# Patient Record
Sex: FEMALE | Race: WHITE
[De-identification: names, ages, dates, MRNs, and addresses within clinical notes are randomized per-mention and may not be internally consistent; named-entity substitution may affect disease eponyms.]

---

## 2017-09-22 ENCOUNTER — HOSPITAL ENCOUNTER (OUTPATIENT)
Dept: HOSPITAL 76 - DI | Age: 42
Discharge: HOME | End: 2017-09-22
Attending: FAMILY MEDICINE
Payer: COMMERCIAL

## 2017-09-22 DIAGNOSIS — M67.952: Primary | ICD-10-CM

## 2017-09-22 NOTE — MRI REPORT
EXAM:

LEFT HIP MRI WITHOUT CONTRAST

 

EXAM DATE: 9/22/2017 10:32 AM.

 

CLINICAL HISTORY:  Left hip pain for 10 years.

 

COMPARISON: None.

 

TECHNIQUE: Multiplanar, multisequence T1-weighted and fluid-sensitive, small field-of-view sequences 
of the hip and large field-of-view sequences of the pelvis without contrast. Other: None.

 

FINDINGS: 

Bones: No fractures or subluxations. No marrow edema or bone lesions. No avascular necrosis.

 

Left Hip: No acetabular retroversion. Femoral head/neck offset is within normal limits. No effusion o
r loose bodies. The articular cartilage is intact. No labral tear is demonstrated on this nonarthrogr
aphic study. The ligamentum teres is intact.

 

Other Joints: The visualized lumbar spine, sacroiliac joints, symphysis pubis, and contralateral hip 
are unremarkable.

 

Musculature: No edema or fatty atrophy.  Mild insertional tendinopathy of bilateral gluteus medius an
d minimus tendons with mild edema. The visualized hamstring tendons are normal. The left ischiofemora
l space is narrowed with mild edema in the quadratus femoris suggestive of ischiofemoral impingement.
 No sports hernia.

 

Pelvic Cavity: The visualized viscera are unremarkable. No lymphadenopathy. No free fluid in the pelv
is.

 

Other: The visualized sciatic nerves are unremarkable. No bursitis. The subcutaneous tissues are unre
markable. 

 

IMPRESSION: 

1. The left ischiofemoral space is narrowed with mild edema in the quadratus femoris suggestive of is
chiofemoral impingement.

2. No labral tear is demonstrated on this nonarthrographic study.

3. No fractures or acute bony abnormality.

4. Mild insertional tendinopathy of bilateral gluteus medius and minimus tendons with mild edema.

 

[facility] MUSCULOSKELET[facility]IOLOGY SECTION

Referring Provider Line: [phone]

 

SITE ID: [id number]

## 2019-02-25 ENCOUNTER — HOSPITAL ENCOUNTER (OUTPATIENT)
Dept: HOSPITAL 76 - DI.WCP | Age: 44
Discharge: HOME | End: 2019-02-25
Attending: INTERNAL MEDICINE
Payer: COMMERCIAL

## 2019-02-25 DIAGNOSIS — M19.042: ICD-10-CM

## 2019-02-25 DIAGNOSIS — M54.5: Primary | ICD-10-CM

## 2019-02-25 PROCEDURE — 72170 X-RAY EXAM OF PELVIS: CPT

## 2019-02-26 NOTE — XRAY REPORT
Reason:  PAIN UNSPECIFIED JOINT, LOW BACK PAIN

Procedure Date:  02/25/2019   

Accession Number:  [id number] / [id number]                    

Procedure:  WCP - Foot 2 View BILAT CPT Code:  

 

FULL RESULT:

 

 

EXAMS:

1. Right Foot Radiography

2. Left Foot Radiography

 

EXAM DATE: 2/25/2019 03:00 PM.

 

CLINICAL HISTORY: PAIN UNSPECIFIED JOINT, LOW BACK PAIN.

 

COMPARISON: None.

 

TECHNIQUE: 2 views each foot.

 

FINDINGS:

Right:

Bones: Postop changes first metatarsus with screws No fractures or bone 

lesions.

 

Joints: Normal. No subluxations.

 

Soft Tissues: Normal. No soft tissue swelling.

 

Left:

Bones: Postop changes first metatarsus with screws No fractures or bone 

lesions.

 

Joints: Normal. No subluxations.

 

Soft Tissues: Normal. No soft tissue swelling.

IMPRESSION: Bilateral post bunion surgery.

No acute findings

 

[facility]

## 2019-02-26 NOTE — XRAY REPORT
Reason:  PAIN UNSPECIFIED JOINT, LOW BACK PAIN

Procedure Date:  02/25/2019   

Accession Number:  [id number] / [id number]                    

Procedure:  WCP - Hand 2 View BILAT CPT Code:  

 

FULL RESULT:

 

 

EXAMS:

1. Right Hand Radiography

2. Left Hand Radiography

 

EXAM DATE: 2/25/2019 03:10 PM.

 

CLINICAL HISTORY: PAIN UNSPECIFIED JOINT, LOW BACK PAIN.

 

COMPARISON: None.

 

TECHNIQUE: 3 views each hand.

 

FINDINGS:

Right:

Bones: Normal. No fractures or bone lesions.

 

Joints: Normal. No subluxations.

 

Soft Tissues: Normal. No soft tissue swelling.

 

Left:

Bones: Normal. No fractures or bone lesions.

 

Joints: Fifth DIP mild osteophyte

 

Soft Tissues: Normal. No soft tissue swelling.

IMPRESSION:

1. Negative right hand.

2. Mild degenerative changes fifth DIP joint

 

[facility]

## 2021-03-03 ENCOUNTER — HOSPITAL ENCOUNTER (OUTPATIENT)
Dept: HOSPITAL 76 - DI | Age: 46
Discharge: HOME | End: 2021-03-03
Attending: STUDENT IN AN ORGANIZED HEALTH CARE EDUCATION/TRAINING PROGRAM
Payer: COMMERCIAL

## 2021-03-03 DIAGNOSIS — R74.8: Primary | ICD-10-CM

## 2021-03-03 NOTE — MRI REPORT
PROCEDURE:  MRCP W/O

 

INDICATIONS:  ELEVATED DIRECT BILIRUBIN

 

CONTRAST: None

 

TECHNIQUE:  

Coronal ultra fast SE through the abdomen, axial 2-D spoiled GE in- and out-of-phase, and breath-hold
 T2 FSE with fat saturation through the biliary system and pancreas.  Oblique coronal and axial thin-
slice ultra fast SE, radial thick-slab ultra fast SE centered on the extrahepatic bile ducts.  

 

COMPARISON:     

 

FINDINGS:  

Image quality:  Excellent.  

 

Pancreas and biliary system:  Intra- and extra-hepatic biliary ducts are non dilated.  Pancreas is no
rmal in morphology, without adjacent soft tissue edema.  Pancreatic duct is normal in caliber, withou
t developmental anomalies.  Gallbladder demonstrates an anterior septum, no stones, and no polyps. No
 gallbladder wall thickening is present. The cystic duct is normal in caliber. The common bile duct a
lso is normal. No intrahepatic biliary distention is seen. There is no common duct stone.

 

Other solid organs:  Liver and spleen are normal in size.  No adrenal nodules.  Both kidneys are norm
al in size, without hydronephrosis.  

 

Nodes and vessels:  No retroperitoneal or mesenteric adenopathy by size criteria.  Aorta and inferior
 vena cava are normal in size.  

 

Bowel and peritoneum:  Unenhanced bowel loops are normal in caliber.  No free fluid.  

 

Lung bases:  No basal pleural effusions.  Heart size is normal.  Bilateral breast implants show no ev
idence of implant rupture.

 

Bones and soft tissues:  No ventral hernias.  Bone marrow is of normal overall signal.  

 

IMPRESSION:  

No sign of biliary distention or ductal calculus. There is no inflammation involving the gallbladder.
 There is an anterior septum within the gallbladder, a normal anatomic variant not related  to inflam
matory symptomatology. Overall, the source of elevated bilirubin is not identified. Intrinsic hepatoc
ellular disease may be present. A hepatic mass lesion is not seen.

 

Reviewed by: Merrick Kapoor MD on 3/3/2021 5:28 PM PST

Approved by: Merrick Kapoor MD on 3/3/2021 5:28 PM PST

 

 

Station ID:  [id number]

## 2022-03-08 ENCOUNTER — HOSPITAL ENCOUNTER (OUTPATIENT)
Dept: HOSPITAL 76 - ED | Age: 47
Setting detail: OBSERVATION
LOS: 1 days | Discharge: HOME | End: 2022-03-09
Attending: NURSE PRACTITIONER | Admitting: INTERNAL MEDICINE
Payer: COMMERCIAL

## 2022-03-08 DIAGNOSIS — D64.9: ICD-10-CM

## 2022-03-08 DIAGNOSIS — E87.1: ICD-10-CM

## 2022-03-08 DIAGNOSIS — F41.9: ICD-10-CM

## 2022-03-08 DIAGNOSIS — I10: ICD-10-CM

## 2022-03-08 DIAGNOSIS — G40.909: ICD-10-CM

## 2022-03-08 DIAGNOSIS — E86.0: ICD-10-CM

## 2022-03-08 DIAGNOSIS — Z20.822: ICD-10-CM

## 2022-03-08 DIAGNOSIS — R00.2: ICD-10-CM

## 2022-03-08 DIAGNOSIS — M79.7: ICD-10-CM

## 2022-03-08 DIAGNOSIS — F32.A: ICD-10-CM

## 2022-03-08 DIAGNOSIS — E87.6: Primary | ICD-10-CM

## 2022-03-08 DIAGNOSIS — N28.9: ICD-10-CM

## 2022-03-08 LAB
ANION GAP SERPL CALCULATED.4IONS-SCNC: 14 MMOL/L (ref 6–13)
B PARAPERT DNA SPEC QL NAA+PROBE: NOT DETECTED
B PERT DNA SPEC QL NAA+PROBE: NOT DETECTED
BASOPHILS NFR BLD AUTO: 0.1 10^3/UL (ref 0–0.1)
BASOPHILS NFR BLD AUTO: 1.3 %
BUN SERPL-MCNC: 23 MG/DL (ref 6–20)
C PNEUM DNA NPH QL NAA+NON-PROBE: NOT DETECTED
CALCIUM UR-MCNC: 9.7 MG/DL (ref 8.5–10.3)
CHLORIDE SERPL-SCNC: 88 MMOL/L (ref 101–111)
CO2 SERPL-SCNC: 29 MMOL/L (ref 21–32)
CREAT SERPLBLD-SCNC: 1.4 MG/DL (ref 0.4–1)
EOSINOPHIL # BLD AUTO: 0.2 10^3/UL (ref 0–0.7)
EOSINOPHIL NFR BLD AUTO: 3.3 %
ERYTHROCYTE [DISTWIDTH] IN BLOOD BY AUTOMATED COUNT: 17.2 % (ref 12–15)
FLUAV RNA RESP QL NAA+PROBE: NOT DETECTED
GFRSERPLBLD MDRD-ARVRAT: 40 ML/MIN/{1.73_M2} (ref 89–?)
GLUCOSE SERPL-MCNC: 138 MG/DL (ref 70–100)
HAEM INFLU B DNA SPEC QL NAA+PROBE: NOT DETECTED
HCOV 229E RNA SPEC QL NAA+PROBE: NOT DETECTED
HCOV HKU1 RNA UPPER RESP QL NAA+PROBE: NOT DETECTED
HCOV NL63 RNA ASPIRATE QL NAA+PROBE: NOT DETECTED
HCOV OC43 RNA SPEC QL NAA+PROBE: NOT DETECTED
HCT VFR BLD AUTO: 30.4 % (ref 37–47)
HGB UR QL STRIP: 8.8 G/DL (ref 12–16)
HMPV AG SPEC QL: NOT DETECTED
HPIV1 RNA NPH QL NAA+PROBE: NOT DETECTED
HPIV2 SPEC QL CULT: NOT DETECTED
HPIV3 AB TITR SER CF: NOT DETECTED {TITER}
HPIV4 RNA SPEC QL NAA+PROBE: NOT DETECTED
LYMPHOCYTES # SPEC AUTO: 1.4 10^3/UL (ref 1.5–3.5)
LYMPHOCYTES NFR BLD AUTO: 26.8 %
M PNEUMO DNA SPEC QL NAA+PROBE: NOT DETECTED
MAGNESIUM SERPL-MCNC: 2.3 MG/DL (ref 1.7–2.8)
MCH RBC QN AUTO: 23.6 PG (ref 27–31)
MCHC RBC AUTO-ENTMCNC: 28.9 G/DL (ref 32–36)
MCV RBC AUTO: 81.5 FL (ref 81–99)
MONOCYTES # BLD AUTO: 0.6 10^3/UL (ref 0–1)
MONOCYTES NFR BLD AUTO: 10.5 %
NEUTROPHILS # BLD AUTO: 3 10^3/UL (ref 1.5–6.6)
NEUTROPHILS # SNV AUTO: 5.2 X10^3/UL (ref 4.8–10.8)
NEUTROPHILS NFR BLD AUTO: 57.7 %
NRBC # BLD AUTO: 0 /100WBC
NRBC # BLD AUTO: 0 X10^3/UL
PDW BLD AUTO: 9.2 FL (ref 7.9–10.8)
PHOSPHATE BLD-MCNC: 2.9 MG/DL (ref 2.5–4.6)
PLATELET # BLD: 191 10^3/UL (ref 130–450)
POTASSIUM SERPL-SCNC: 2.2 MMOL/L (ref 3.5–5)
RBC MAR: 3.73 10^6/UL (ref 4.2–5.4)
RSV RNA RESP QL NAA+PROBE: NOT DETECTED
RV+EV RNA SPEC QL NAA+PROBE: NOT DETECTED
SARS-COV-2 RNA PNL SPEC NAA+PROBE: NOT DETECTED
SODIUM SERPLBLD-SCNC: 131 MMOL/L (ref 135–145)

## 2022-03-08 PROCEDURE — 99283 EMERGENCY DEPT VISIT LOW MDM: CPT

## 2022-03-08 PROCEDURE — 82746 ASSAY OF FOLIC ACID SERUM: CPT

## 2022-03-08 PROCEDURE — 84132 ASSAY OF SERUM POTASSIUM: CPT

## 2022-03-08 PROCEDURE — 0202U NFCT DS 22 TRGT SARS-COV-2: CPT

## 2022-03-08 PROCEDURE — 80048 BASIC METABOLIC PNL TOTAL CA: CPT

## 2022-03-08 PROCEDURE — 71045 X-RAY EXAM CHEST 1 VIEW: CPT

## 2022-03-08 PROCEDURE — 99285 EMERGENCY DEPT VISIT HI MDM: CPT

## 2022-03-08 PROCEDURE — 85025 COMPLETE CBC W/AUTO DIFF WBC: CPT

## 2022-03-08 PROCEDURE — 82607 VITAMIN B-12: CPT

## 2022-03-08 PROCEDURE — 84466 ASSAY OF TRANSFERRIN: CPT

## 2022-03-08 PROCEDURE — 83735 ASSAY OF MAGNESIUM: CPT

## 2022-03-08 PROCEDURE — 96360 HYDRATION IV INFUSION INIT: CPT

## 2022-03-08 PROCEDURE — 96361 HYDRATE IV INFUSION ADD-ON: CPT

## 2022-03-08 PROCEDURE — 36415 COLL VENOUS BLD VENIPUNCTURE: CPT

## 2022-03-08 PROCEDURE — 80175 DRUG SCREEN QUAN LAMOTRIGINE: CPT

## 2022-03-08 PROCEDURE — 93005 ELECTROCARDIOGRAM TRACING: CPT

## 2022-03-08 PROCEDURE — 84484 ASSAY OF TROPONIN QUANT: CPT

## 2022-03-08 PROCEDURE — 83540 ASSAY OF IRON: CPT

## 2022-03-08 PROCEDURE — 83880 ASSAY OF NATRIURETIC PEPTIDE: CPT

## 2022-03-08 PROCEDURE — 84100 ASSAY OF PHOSPHORUS: CPT

## 2022-03-08 NOTE — HISTORY & PHYSICAL EXAMINATION
Chief Complaint





- Chief Complaint


Chief Complaint: My doctor sent me into ER





History of Present Illness





- Admitted From


Admitted From:: ED





- History Obtained From


History obtained from: ED provider and the patient





- History of Present Illness


HPI Comment/Other: 





This is a 46-year-old white female with a history of seizure disorder, 

fibromyalgia, anxiety and hypertension. She was sent into the ED by her doctor's

office after being called and told her potassium was very low.  She had had 

blood tests done earlier to check her seizure medication blood levels and 

electrolytes.  The patient denied any significant symptoms in the ED such as 

chest pain, shortness of breath, syncope or palpitations. Her labs were redone 

in the ED and showed a potassium of 2.2, sodium 131, hemoglobin 8.8 (the last 

hemoglobin was 14, from labs in 2014), and creatinine 1.4. She started to get iv

fluids and potassium replacement in the ED and is being placed in Observation 

status to continue potassium replacement while on telemetry. The patient denies 

any hematemesis or melena.  She takes Chlorthalidone for blood pressure control,

as one of her medications. She is also on anti-seizure medications.  These blood

levels were not rechecked today in the ED.








History





- Past Medical History


Cardiovascular: reports: Hypertension


Respiratory: reports: None


Neuro: reports: Seizure disorder


Endocrine/Autoimmune: reports: None


GI: reports: None


GYN: reports: None


: reports: None


HEENT: reports: None


Psych: reports: Depression, Anxiety, Other (Remote Hx of alcohol abuse from age 

16 to 2013.)


Musculoskeletal: reports: Fibromyalgia


Derm: reports: None





- Past Surgical History


Cardiovascular: reports: Other





- Family & Social History


Family History Comment/Other: She has 3 children who are adults and have moved 

out of the house. 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


Living arrangement: At home


Living Situation: With spouse/s.o.


Social History Notes: She admits to alcohol abuse from age 16 until 2013, when 

her seizure disorder was discovered.  She was sober through all her pregnancies 

she reported and currently has a rare beer or cocktail. She is a non-smoker and 

never smoked.





- Substance History


Use: Uses substance without health or social issues: NONE





Meds/Allgy





- Home Medications


Home Medications: 


                                Ambulatory Orders











 Medication  Instructions  Recorded  Confirmed


 


Buspirone HCl 10 mg PO DAILY 03/08/22 03/08/22


 


Chlorthalidone 25 mg PO DAILY 03/08/22 03/08/22


 


Duloxetine HCl [Cymbalta] 60 mg PO DAILY 03/08/22 03/08/22


 


Topiramate 50 mg PO DAILY 03/08/22 03/08/22


 


lamoTRIgine [LaMICtal] 100 mg PO DAILY 03/08/22 03/08/22














- Allergies


Allergies/Adverse Reactions: 


                                    Allergies











Allergy/AdvReac Type Severity Reaction Status Date / Time


 


Sulfa (Sulfonamide Allergy  Anaphylaxis Verified 03/08/22 19:42





Antibiotics)     














Review of Systems





- Cardiovascular


Cariovascular: reports: Palpitations (Joann skipped beats), Other (She has had 2-3

episodes of "body shaking" and near syncope lately when she stands.)





- Musculoskeletal


Musculoskeletal: reports: Back pain, Joint pain (from Fibromyalgia)





- Neurological


Neurological: reports: Seizures (Last grand mal seizure was remote, but she gets

"auras" for which she takes Lorazepam prn.)





- All Other Systems


All Other Systems: reports: Reviewed and negative





Exam





- Vital Signs


Reviewed Vital Signs: Yes


Vital Signs: 





                                Vital Signs x48h











  Temp Pulse Resp BP Pulse Ox


 


 03/08/22 19:42  36.5 C  87  16  148/80 H  100














- Physical Exam


General Appearance: positive: No acute distress, Alert, Other (Hair is shaven 

off, she is wearing a baseball cap.)


Eyes Bilateral: positive: Normal inspection, EOMI


ENT: positive: ENT inspection nml, No signs of dehydration


Neck: positive: Nml inspection, No JVD


Respiratory: positive: No respiratory distress, Breath sounds nml


Cardiovascular: positive: Regular rate & rhythm, No murmur


Abdomen: positive: Non-tender, Nml bowel sounds, No distention


Skin: positive: Warm, Dry


Extremities: positive: Non-tender, No pedal edema


Neurologic/Psychiatric: positive: Oriented x3, Other (Non-focal)





Conclusion/Plan





- Problem List


(1) Hypokalemia


Conclusion/Plan: 


This is likely due to Chlorthalidone daily and long-term use, without a 

Potassium supplement.


We will continue to replace with IV maintenance fluids containing potassium and 

will give potassium riders IV and oral potassium p.o.


Follow her potassium level every 6-12 hours.


Continue on telemetry while she has such severely low K


We will also check a serum magnesium level with her next potassium blood draw 

since this could also be lost when using Chlorthalidone.








(2) Hyponatremia


Conclusion/Plan: 


This is likely hypovolemic hyponatremia, given her use of thiazide diuretic and 

the new DELORIS.


Will continue replacement with IV normal saline


Follow BMP daily








(3) Acute renal insufficiency


Conclusion/Plan: 


This is likely from her diuretic use.


In review of systems she told me she has had "2-3 episodes of "body shaking" and

near syncope lately when she stands". This could be from orthostasis. She and 

her  think it could be seizures trying to break through, and for these 

symptoms her serum seizure drug levels were drawn, ordered by her doctor.


Continue with IV fluids that were started in the ED. Stop and remain off the 

thiazide diuretic will be advised.


Avoid nephrotoxins.


Follow BUN/creatinine daily.


Will check orthostatic vital signs in the morning.


I advised she not use the chlorthalidone any longer as a blood pressure 

medicine. A different one can be started if needed.








(4) Anemia


Conclusion/Plan: 


Will check her serum iron stores and B12 and folate levels and guaiac of her 

stool to initiate the work-up of cause of anemia.


Qualifiers: 


   Anemia type: unspecified type   Qualified Code(s): D64.9 - Anemia, 

unspecified   





(5) HTN (hypertension)


Conclusion/Plan: 


Stop and remain off the thiazide diuretic will be advised. I discussed this in 

detail with the patient and explained why. Her "body shaking when she stands" 

symptom, could be near syncope from orthostasis.


Will check orthostatic vital signs in the morning


Her supine vital signs will be monitored and she may need to be started on a 

different agent for blood pressure control, if needed, potentially using 

Amlodipine 2.5 mg daily.








(6) Seizure disorder


Conclusion/Plan: 


Her usual anti-seizure medications will be continued.








(7) Anxiety


Conclusion/Plan: 


Her usual medications will be continued.


She requests a one-time oral medication for anxiety currently. Lorazepam 1 mg 

p.o. at bedtime now will be ordered








(8) Fibromyalgia


Conclusion/Plan: 


Her usual medications will be continued








- Lab Results


Fish Bones: 


                                 03/08/22 19:56





                                 03/08/22 19:56

## 2022-03-08 NOTE — XRAY REPORT
PROCEDURE:  Chest 1 View X-Ray

 

INDICATIONS:  dyspnea

 

TECHNIQUE:  One view of the chest was acquired.  

 

COMPARISON:  None

 

FINDINGS:  

 

Surgical changes and devices:  None.  

 

Lungs and pleura:  No pleural effusions or pneumothorax.  Lungs are clear.  

 

Mediastinum:  Mediastinal contours appear normal.  Heart size is normal.  

 

Bones and chest wall:  No suspicious bony lesions.  Overlying soft tissues appear unremarkable.  

 

IMPRESSION:  

Chest without acute cardiopulmonary abnormalities. No focal airspace disease.

 

Reviewed by: Kaleb Sun MD on 3/8/2022 8:28 PM PST

Approved by: Kaleb Sun MD on 3/8/2022 8:28 PM [facility]

 

 

Station ID:  [id number]

## 2022-03-08 NOTE — ED PHYSICIAN DOCUMENTATION
History of Present Illness





- Stated complaint


Stated Complaint: LOW POTASSIUM





- Chief complaint


Chief Complaint: General





- History obtained from


History obtained from: Patient





- History of Present Illness


Timing: Today


Pain level max: 0


Pain level now: 0





- Additonal information


Additional information: 





Patient is a 38-year-old female who presents to the emergency department stating

that she was called by her doctor to come to the emergency department for a low 

potassium level today.  She states that she does not know how low the potassium 

was that the doctor from the [facility] did not tell her.  She states that she 

has not had issues with potassium in the past.  She states that she has felt 

mildly short of breath for several weeks.  Has not noticed any blood in the 

stool.  Nothing makes it better or worse.  Occasionally has palpitations.





Review of Systems


Ten Systems: 10 systems reviewed and negative


Constitutional: denies: Fever, Chills


Throat: denies: Sore throat


Cardiac: denies: Chest pain / pressure, Calf pain


GI: denies: Nausea, Vomiting, Diarrhea, Hematemesis


Skin: denies: Rash


Musculoskeletal: denies: Neck pain, Back pain


Neurologic: denies: Headache





PD PAST MEDICAL HISTORY





- Past Medical History


Past Medical History: Yes


Cardiovascular: Hypertension


Neuro: Seizure disorder





- Present Medications


Home Medications: 


                                Ambulatory Orders











 Medication  Instructions  Recorded  Confirmed


 


Buspirone HCl 10 mg PO DAILY 03/08/22 03/08/22


 


Chlorthalidone 25 mg PO DAILY 03/08/22 03/08/22


 


Duloxetine HCl [Cymbalta] 60 mg PO DAILY 03/08/22 03/08/22


 


Topiramate 50 mg PO DAILY 03/08/22 03/08/22


 


lamoTRIgine [LaMICtal] 100 mg PO DAILY 03/08/22 03/08/22














- Allergies


Allergies/Adverse Reactions: 


                                    Allergies











Allergy/AdvReac Type Severity Reaction Status Date / Time


 


Sulfa (Sulfonamide Allergy  Anaphylaxis Verified 03/08/22 19:42





Antibiotics)     














- Living Situation


Living Arrangement: reports: At home





PD ED PE NORMAL





- Vitals


Vital signs reviewed: Yes





- General


General: Alert and oriented X 3, No acute distress





- HEENT


HEENT: PERRL, Moist mucous membranes





- Neck


Neck: Supple, no meningeal sign





- Cardiac


Cardiac: RRR, Strong equal pulses





- Respiratory


Respiratory: No respiratory distress, Clear bilaterally





- Abdomen


Abdomen: Soft, Non tender, Non distended





- Rectal


Rectal: Pt declined





- Derm


Derm: Warm and dry





- Extremities


Extremities: No edema, No calf tenderness / cord





- Neuro


Neuro: Alert and oriented X 3





- Psych


Psych: Normal mood, Normal affect





Results





- Vitals


Vitals: 


                               Vital Signs - 24 hr











  03/08/22





  19:42


 


Temperature 36.5 C


 


Heart Rate 87


 


Respiratory 16





Rate 


 


Blood Pressure 148/80 H


 


O2 Saturation 100








                                     Oxygen











O2 Source                      Room air

















- EKG (time done)


  ** 2007


Rate: Rate (enter#) (67)


Rhythm: NSR


Axis: Normal


Intervals: Normal AK


QRS: Normal


Ischemia: Normal ST segments





- Labs


Labs: 


                                Laboratory Tests











  03/08/22 03/08/22 03/08/22





  19:56 19:56 19:56


 


WBC  5.2  


 


RBC  3.73 L  


 


Hgb  8.8 L  


 


Hct  30.4 L  


 


MCV  81.5  


 


MCH  23.6 L  


 


MCHC  28.9 L  


 


RDW  17.2 H  


 


Plt Count  191  


 


MPV  9.2  


 


Neut # (Auto)  3.0  


 


Lymph # (Auto)  1.4 L  


 


Mono # (Auto)  0.6  


 


Eos # (Auto)  0.2  


 


Baso # (Auto)  0.1  


 


Absolute Nucleated RBC  0.00  


 


Nucleated RBC %  0.0  


 


Sodium   131 L 


 


Potassium   2.2 L* 


 


Chloride   88 L 


 


Carbon Dioxide   29 


 


Anion Gap   14.0 H 


 


BUN   23 H 


 


Creatinine   1.4 H 


 


Estimated GFR (MDRD)   40 L 


 


Glucose   138 H 


 


Calcium   9.7 


 


Phosphorus   2.9 


 


Magnesium   2.3 


 


Troponin I High Sens    7.4


 


B-Natriuretic Peptide   














  03/08/22





  19:56


 


WBC 


 


RBC 


 


Hgb 


 


Hct 


 


MCV 


 


MCH 


 


MCHC 


 


RDW 


 


Plt Count 


 


MPV 


 


Neut # (Auto) 


 


Lymph # (Auto) 


 


Mono # (Auto) 


 


Eos # (Auto) 


 


Baso # (Auto) 


 


Absolute Nucleated RBC 


 


Nucleated RBC % 


 


Sodium 


 


Potassium 


 


Chloride 


 


Carbon Dioxide 


 


Anion Gap 


 


BUN 


 


Creatinine 


 


Estimated GFR (MDRD) 


 


Glucose 


 


Calcium 


 


Phosphorus 


 


Magnesium 


 


Troponin I High Sens 


 


B-Natriuretic Peptide  30














- Rads (name of study)


  ** cxr


Radiology: Final report received, EMP read contemporaneously, See rad report (no

 acute disease)





PD MEDICAL DECISION MAKING





- ED course


Complexity details: reviewed results, re-evaluated patient, considered 

differential, d/w patient, d/w consultant


ED course: 





46-year-old female with what appears to be dehydration, acute renal 

insufficiency and hypokalemia, likely all secondary to the chlorthalidone.  

Patient is well-appearing, nontoxic.  Given IV fluids and IV potassium.  We will

 place the patient in observation for continued care.  She also has anemia, 

unclear etiology.  She declined a rectal exam here.  She has not had a 

colonoscopy before, but could consider this as an outpatient.  Discussed the 

case with Dr. Ramirez, hospitalist who accepts for observation





This document was made in part using voice recognition software. While efforts 

are made to proofread this document, sound alike and grammatical errors may 

occur.





The only old labs that are available currently are from 2014.





Departure





- Departure


Disposition: ED Place in Observation


Clinical Impression: 


 Hypokalemia, Acute renal insufficiency, Hyponatremia, Hypochloremia





Anemia


Qualifiers:


 Anemia type: unspecified type Qualified Code(s): D64.9 - Anemia, unspecified





Condition: Stable

## 2022-03-09 VITALS — SYSTOLIC BLOOD PRESSURE: 118 MMHG | DIASTOLIC BLOOD PRESSURE: 69 MMHG

## 2022-03-09 LAB
ANION GAP SERPL CALCULATED.4IONS-SCNC: 13 MMOL/L (ref 6–13)
BASOPHILS NFR BLD AUTO: 0 10^3/UL (ref 0–0.1)
BASOPHILS NFR BLD AUTO: 0.9 %
BUN SERPL-MCNC: 19 MG/DL (ref 6–20)
CALCIUM UR-MCNC: 9.6 MG/DL (ref 8.5–10.3)
CHLORIDE SERPL-SCNC: 93 MMOL/L (ref 101–111)
CO2 SERPL-SCNC: 28 MMOL/L (ref 21–32)
CREAT SERPLBLD-SCNC: 1.1 MG/DL (ref 0.4–1)
EOSINOPHIL # BLD AUTO: 0.2 10^3/UL (ref 0–0.7)
EOSINOPHIL NFR BLD AUTO: 3.6 %
ERYTHROCYTE [DISTWIDTH] IN BLOOD BY AUTOMATED COUNT: 17.1 % (ref 12–15)
FOLATE SERPL-MCNC: 18.78 NG/ML
GFRSERPLBLD MDRD-ARVRAT: 53 ML/MIN/{1.73_M2} (ref 89–?)
GLUCOSE SERPL-MCNC: 99 MG/DL (ref 70–100)
HCT VFR BLD AUTO: 28.8 % (ref 37–47)
HGB UR QL STRIP: 8.2 G/DL (ref 12–16)
IRON SATN MFR SERPL: 5 % (ref 20–50)
IRON SERPL-MCNC: 25 UG/DL (ref 28–170)
LYMPHOCYTES # SPEC AUTO: 1.4 10^3/UL (ref 1.5–3.5)
LYMPHOCYTES NFR BLD AUTO: 30.9 %
MCH RBC QN AUTO: 23 PG (ref 27–31)
MCHC RBC AUTO-ENTMCNC: 28.5 G/DL (ref 32–36)
MCV RBC AUTO: 80.7 FL (ref 81–99)
MONOCYTES # BLD AUTO: 0.5 10^3/UL (ref 0–1)
MONOCYTES NFR BLD AUTO: 11.4 %
NEUTROPHILS # BLD AUTO: 2.4 10^3/UL (ref 1.5–6.6)
NEUTROPHILS # SNV AUTO: 4.5 X10^3/UL (ref 4.8–10.8)
NEUTROPHILS NFR BLD AUTO: 53 %
NRBC # BLD AUTO: 0 /100WBC
NRBC # BLD AUTO: 0 X10^3/UL
PDW BLD AUTO: 9.6 FL (ref 7.9–10.8)
PLATELET # BLD: 197 10^3/UL (ref 130–450)
POTASSIUM SERPL-SCNC: 3 MMOL/L (ref 3.5–5)
RBC MAR: 3.57 10^6/UL (ref 4.2–5.4)
SODIUM SERPLBLD-SCNC: 134 MMOL/L (ref 135–145)
TIBC SERPL-MCNC: 531 UG/DL (ref 250–450)
TRANSFERRIN SERPL-MCNC: 379 MG/DL (ref 192–382)
VIT B12 SERPL-MCNC: 996 PG/ML (ref 180–914)

## 2022-03-09 RX ADMIN — POTASSIUM CHLORIDE SCH MLS/HR: 7.46 INJECTION, SOLUTION INTRAVENOUS at 01:51

## 2022-03-09 RX ADMIN — POTASSIUM CHLORIDE SCH MLS/HR: 7.46 INJECTION, SOLUTION INTRAVENOUS at 10:56

## 2022-03-09 RX ADMIN — POTASSIUM CHLORIDE SCH MLS/HR: 7.46 INJECTION, SOLUTION INTRAVENOUS at 03:59

## 2022-03-09 RX ADMIN — POTASSIUM CHLORIDE SCH MLS/HR: 7.46 INJECTION, SOLUTION INTRAVENOUS at 07:23

## 2022-03-09 RX ADMIN — POTASSIUM CHLORIDE SCH MLS/HR: 7.46 INJECTION, SOLUTION INTRAVENOUS at 00:44

## 2022-03-09 RX ADMIN — SODIUM CHLORIDE, PRESERVATIVE FREE SCH ML: 5 INJECTION INTRAVENOUS at 00:39

## 2022-03-09 RX ADMIN — POTASSIUM CHLORIDE SCH MLS/HR: 7.46 INJECTION, SOLUTION INTRAVENOUS at 02:53

## 2022-03-09 RX ADMIN — SODIUM CHLORIDE, PRESERVATIVE FREE SCH: 5 INJECTION INTRAVENOUS at 12:45

## 2022-03-09 RX ADMIN — POTASSIUM CHLORIDE SCH MLS/HR: 7.46 INJECTION, SOLUTION INTRAVENOUS at 09:51

## 2022-03-09 NOTE — DISCHARGE SUMMARY
Discharge Summary


Admit Date: 03/08/22


Discharge Date: 03/09/22


Discharging Provider: Pepito Quintana


Primary Care Provider: Kaleb Nicolas


Condition at Discharge: Stable


Discharge Disposition: 01 Home, Self Care


Discharge Facility Name: home





- DIAGNOSES


Discharge Diagnoses with Status of Each Condition: 





(1) Hypokalemia


potassium is 3.4 now. pt was given another 20meq PO potassium. Patient may 

follow-up with her PCP to recheck electrolytes in 1 week





(2) Hyponatremia


Sodium Is 134. Advised patient keep hydration, follow-up with her PCP to recheck

electrolytes in 1 week. Patient's home medication HCTZ is on hold.





(3) Acute renal insufficiency


Resolved, patient may keep hydration in the home





(4) Anemia


pt denies black or fresh bloody stool. pt declined rectal examination at ER. 

Patient is found to have iron deficiency. Patient is prescript iron supplement. 

Patient may follow-up with her PCP to recheck hemoglobin In 1 week and follow-up

with hematologist as outpatient





(5) HTN (hypertension)


stable. 





(6) Seizure disorder


stable, no seizure at hospital. Resume patient home meds





(7) Anxiety


Stable, resume patient's home meds





(8) Fibromyalgia


Stable, resume home meds





- [facility]


History of Present Illness: 


refer from Dr. Kailyn LEBRON's HPI on 3/8/22


This is a 46-year-old white female with a history of seizure disorder, 

fibromyalgia, anxiety and hypertension. She was sent into the ED by her doctor's

office after being called and told her potassium was very low.  She had had 

blood tests done earlier to check her seizure medication blood levels and 

electrolytes.  The patient denied any significant symptoms in the ED such as 

chest pain, shortness of breath, syncope or palpitations. Her labs were redone 

in the ED and showed a potassium of 2.2, sodium 131, hemoglobin 8.8 (the last 

hemoglobin was 14, from labs in 2014), and creatinine 1.4. She started to get iv

fluids and potassium replacement in the ED and is being placed in Observation 

status to continue potassium replacement while on telemetry. The patient denies 

any hematemesis or melena.  She takes Chlorthalidone for blood pressure control,

as one of her medications. She is also on anti-seizure medications.  These blood

levels were not rechecked today in the ED.





- ALLERGIES


Allergies/Adverse Reactions: 


                                    Allergies











Allergy/AdvReac Type Severity Reaction Status Date / Time


 


Sulfa (Sulfonamide Allergy  Anaphylaxis Verified 03/08/22 19:42





Antibiotics)     














- MEDICATIONS


Home Medications: 


                                Ambulatory Orders











 Medication  Instructions  Recorded  Confirmed


 


Buspirone HCl 10 mg PO DAILY 03/08/22 03/08/22


 


Duloxetine HCl [Cymbalta] 60 mg PO DAILY 03/08/22 03/08/22


 


Topiramate 50 mg PO DAILY 03/08/22 03/08/22


 


lamoTRIgine [LaMICtal] 100 mg PO DAILY 03/08/22 03/08/22


 


Ferrous Sulfate [Feosol] 325 mg PO DAILY #30 tablet 03/09/22 














- PHYSICAL EXAM AT DISCHARGE


General Appearance: positive: No acute distress, Alert.  negative: Lethargic


Eyes Bilateral: positive: Normal inspection, No lid inflammation


ENT: positive: ENT inspection nml, No signs of dehydration.  negative: Purulent 

nasal drainage


Neck: positive: Nml inspection, Trachea midline.  negative: Tracheal deviation


Respiratory: positive: Chest non-tender, No respiratory distress, Breath sounds 

nml.  negative: Wheezes, Rales


Cardiovascular: positive: Regular rate & rhythm, No murmur.  negative: 

Tachycardia, Bradycardia, Systolic murmur


Peripheral Pulses: positive: 2+


Abdomen: positive: Non-tender, Nml bowel sounds, No distention.  negative: 

Tenderness


Back: positive: Nml inspection


Skin: positive: Color nml, Warm, Dry.  negative: Cyanosis


Extremities: positive: Non-tender, Full ROM, Nml appearance


Neurologic/Psychiatric: positive: Oriented x3, Motor nml, Sensation nml, 

Mood/affect nml.  negative: Weakness, Sensory loss, Facial droop, Slurred/abnml 

speech, Depressed mood/affect





- LABS


Result Diagrams: 


                                 03/09/22 04:32





                                 03/09/22 11:04





- FOLLOW UP


Follow Up: 


You had significant hypokalemia and dehydration at the admission. After 

treatment, your hypokalemia and dehydration status are resolved now. Your home 

medication HCTZ is hold now. You may keep hydration at home, followup with your 

PCP in one week to recheck your Electrolytes.


Your HGB is 8.2 now. You are also found iron deficiency. You are prescribed iron

 supplement. You may follow-up with your PCP in 1 week to recheck your 

hemoglobin level, then Follow-up with hematologist as outpatient.


You may follow-up with your PCP in 1 week, and blood work to check hemoglobin 

and electrolytes. Should your symptoms return or worse, you may present to ER or

 call 911 for help








- TIME SPENT


Time Spent in Discharge (Minutes): 30

## 2022-03-09 NOTE — DISCHARGE PLAN
Discharge Plan


Problem Reviewed?: Yes


Disposition: 01 Home, Self Care


Condition: Stable


Prescriptions: 


Ferrous Sulfate [Feosol] 325 mg PO DAILY #30 tablet


Diet: Regular


Activity Restrictions: Activity as Tolerated


Shower Restrictions: No (fall precaution)


Instruction Topics:  Anemia Ch, Dehydration, Hypokalemia Dc, Hyponatremia Dc, ED

 Diet High Potassium


Health Concerns: 


dehydration, hypokalemia, anemia


Plan of Treatment: 


You had significant hypokalemia and dehydration at the admission. After 

treatment, your hypokalemia and dehydration status are resolved now. Your home 

medication HCTZ is hold now. You may keep hydration at home, followup with your 

PCP in one week to recheck your Electrolytes.


Your HGB is 8.2 now. You are also found iron deficiency. You are prescribed iron

 supplement. You may follow-up with your PCP in 1 week to recheck your 

hemoglobin level, then Follow-up with hematologist as outpatient


Care Goals: 


Stabilization and improvement/resolved of your medical problems


Assessment: 


Discussed the care plan with you, answered your questions, you understood and 

agreed


Additional Instructions or Follow Up instructions: 


You may follow-up with your PCP in 1 week, and blood work to check hemoglobin 

and electrolytes. Should your symptoms return or worse, you may present to ER or

 call 911 for help


No Smoking: If you smoke, Please STOP!  Call [phone] for help.


Follow-up with: 


JOSE DAVID CORCORAN MD [Primary Care Provider] -

## 2022-04-24 ENCOUNTER — HOSPITAL ENCOUNTER (OUTPATIENT)
Dept: HOSPITAL 76 - RT | Age: 47
Discharge: HOME | End: 2022-04-24
Attending: STUDENT IN AN ORGANIZED HEALTH CARE EDUCATION/TRAINING PROGRAM
Payer: COMMERCIAL

## 2022-04-24 DIAGNOSIS — R06.00: Primary | ICD-10-CM

## 2022-04-24 PROCEDURE — 94060 EVALUATION OF WHEEZING: CPT

## 2022-04-24 PROCEDURE — 94729 DIFFUSING CAPACITY: CPT

## 2022-04-24 RX ADMIN — ALBUTEROL SULFATE STA PUFFS: 90 AEROSOL, METERED RESPIRATORY (INHALATION) at 12:53

## 2023-10-11 ENCOUNTER — HOSPITAL ENCOUNTER (EMERGENCY)
Dept: HOSPITAL 76 - ED | Age: 48
LOS: 1 days | Discharge: PSYCHIATRIC OR SUBSTANCE USE FACILITY | End: 2023-10-12
Payer: COMMERCIAL

## 2023-10-11 DIAGNOSIS — F32.A: Primary | ICD-10-CM

## 2023-10-11 DIAGNOSIS — R89.2: ICD-10-CM

## 2023-10-11 DIAGNOSIS — Z20.822: ICD-10-CM

## 2023-10-11 DIAGNOSIS — T46.4X2A: ICD-10-CM

## 2023-10-11 LAB
ALBUMIN DIAFP-MCNC: 4.5 G/DL (ref 3.2–5.5)
ALBUMIN/GLOB SERPL: 1.4 {RATIO} (ref 1–2.2)
ALP SERPL-CCNC: 228 IU/L (ref 42–121)
ALT SERPL W P-5'-P-CCNC: 47 IU/L (ref 10–60)
AMPHET UR QL SCN: NEGATIVE
ANION GAP SERPL CALCULATED.4IONS-SCNC: 10 MMOL/L (ref 6–13)
APAP SERPL-MCNC: < 0.1 UG/ML
AST SERPL W P-5'-P-CCNC: 81 IU/L (ref 10–42)
BARBITURATES UR QL SCN>300 NG/ML: NEGATIVE
BASOPHILS NFR BLD AUTO: 0 %
BASOPHILS NFR BLD AUTO: 0 10^3/UL (ref 0–0.1)
BENZODIAZ UR QL SCN: POSITIVE
BILIRUB BLD-MCNC: 1 MG/DL (ref 0.2–1)
BUN SERPL-MCNC: 17 MG/DL (ref 6–20)
CALCIUM UR-MCNC: 10.4 MG/DL (ref 8.5–10.3)
CHLORIDE SERPL-SCNC: 104 MMOL/L (ref 101–111)
CLARITY UR REFRACT.AUTO: CLEAR
CO2 SERPL-SCNC: 25 MMOL/L (ref 21–32)
COCAINE UR-SCNC: NEGATIVE UMOL/L
CREAT SERPLBLD-SCNC: 0.9 MG/DL (ref 0.6–1.3)
EOSINOPHIL # BLD AUTO: 0 10^3/UL (ref 0–0.7)
EOSINOPHIL NFR BLD AUTO: 0 %
ERYTHROCYTE [DISTWIDTH] IN BLOOD BY AUTOMATED COUNT: 13.5 % (ref 12–15)
ETHANOL BLD-MCNC: 16 MG/DL
GFRSERPLBLD MDRD-ARVRAT: 66 ML/MIN/{1.73_M2} (ref 89–?)
GLOBULIN SER-MCNC: 3.3 G/DL (ref 2.1–4.2)
GLUCOSE SERPL-MCNC: 92 MG/DL (ref 74–104)
GLUCOSE UR QL STRIP.AUTO: NEGATIVE MG/DL
HCG UR QL: NEGATIVE
HCT VFR BLD AUTO: 38.4 % (ref 37–47)
HGB UR QL STRIP: 12.6 G/DL (ref 12–16)
KETONES UR QL STRIP.AUTO: NEGATIVE MG/DL
LIPASE SERPL-CCNC: 94 U/L (ref 11–82)
LYMPHOCYTES # SPEC AUTO: 0.8 10^3/UL (ref 1.5–3.5)
LYMPHOCYTES NFR BLD AUTO: 18.1 %
MCH RBC QN AUTO: 35.2 PG (ref 27–31)
MCHC RBC AUTO-ENTMCNC: 32.8 G/DL (ref 32–36)
MCV RBC AUTO: 107.3 FL (ref 81–99)
METHADONE UR QL SCN: NEGATIVE
METHAMPHET UR QL SCN: NEGATIVE
MONOCYTES # BLD AUTO: 0.4 10^3/UL (ref 0–1)
MONOCYTES NFR BLD AUTO: 10 %
NEUTROPHILS # BLD AUTO: 3.1 10^3/UL (ref 1.5–6.6)
NEUTROPHILS # SNV AUTO: 4.3 X10^3/UL (ref 4.8–10.8)
NEUTROPHILS NFR BLD AUTO: 71.7 %
NITRITE UR QL STRIP.AUTO: NEGATIVE
NRBC # BLD AUTO: 0 /100WBC
NRBC # BLD AUTO: 0 X10^3/UL
OPIATES UR QL SCN: NEGATIVE
PDW BLD AUTO: 8.5 FL (ref 7.9–10.8)
PH UR STRIP.AUTO: 6.5 PH (ref 5–7.5)
PLATELET # BLD: 79 10^3/UL (ref 130–450)
POTASSIUM SERPL-SCNC: 4 MMOL/L (ref 3.5–4.5)
PROT SPEC-MCNC: 7.8 G/DL (ref 6.4–8.9)
PROT UR STRIP.AUTO-MCNC: NEGATIVE MG/DL
RBC # UR STRIP.AUTO: NEGATIVE /UL
RBC MAR: 3.58 10^6/UL (ref 4.2–5.4)
SALICYLATES SERPL-MCNC: < 1.5 MG/DL
SODIUM SERPLBLD-SCNC: 139 MMOL/L (ref 135–145)
SP GR UR STRIP.AUTO: 1.02 (ref 1–1.03)
THC UR QL SCN: NEGATIVE
TSH SERPL-ACNC: 1.23 UIU/ML (ref 0.34–5.6)
UROBILINOGEN UR QL STRIP.AUTO: (no result) E.U./DL
UROBILINOGEN UR STRIP.AUTO-MCNC: NEGATIVE MG/DL
VOLATILE DRUGS POS SERPL SCN: (no result)

## 2023-10-11 PROCEDURE — 81025 URINE PREGNANCY TEST: CPT

## 2023-10-11 PROCEDURE — 80053 COMPREHEN METABOLIC PANEL: CPT

## 2023-10-11 PROCEDURE — 94640 AIRWAY INHALATION TREATMENT: CPT

## 2023-10-11 PROCEDURE — 81001 URINALYSIS AUTO W/SCOPE: CPT

## 2023-10-11 PROCEDURE — 85025 COMPLETE CBC W/AUTO DIFF WBC: CPT

## 2023-10-11 PROCEDURE — 84443 ASSAY THYROID STIM HORMONE: CPT

## 2023-10-11 PROCEDURE — 80306 DRUG TEST PRSMV INSTRMNT: CPT

## 2023-10-11 PROCEDURE — 87086 URINE CULTURE/COLONY COUNT: CPT

## 2023-10-11 PROCEDURE — 87635 SARS-COV-2 COVID-19 AMP PRB: CPT

## 2023-10-11 PROCEDURE — 80307 DRUG TEST PRSMV CHEM ANLYZR: CPT

## 2023-10-11 PROCEDURE — 36415 COLL VENOUS BLD VENIPUNCTURE: CPT

## 2023-10-11 PROCEDURE — 80320 DRUG SCREEN QUANTALCOHOLS: CPT

## 2023-10-11 PROCEDURE — 99285 EMERGENCY DEPT VISIT HI MDM: CPT

## 2023-10-11 PROCEDURE — 80329 ANALGESICS NON-OPIOID 1 OR 2: CPT

## 2023-10-11 PROCEDURE — 83690 ASSAY OF LIPASE: CPT

## 2023-10-11 PROCEDURE — 81003 URINALYSIS AUTO W/O SCOPE: CPT

## 2023-10-11 NOTE — ED PHYSICIAN DOCUMENTATION
PD HPI MHE





- Stated complaint


Stated Complaint: MHE/OD





- Chief complaint


Chief Complaint: MHE





- History obtained from


History obtained from: Patient





- Additional information


Additional information: 





48yF with history of depression and anxiety p/w passive SI, depressed mood. 

denies active SI/HI/AVH. she endorses feeling trapped in her marriage, states 

her  is angry and overbearing. he accused her of not taking her meds 

regularly per her report, but she says she is careful to do so. patient does 

state she took about 18 pills of lisinopril yesterday evening and several pain 

pills in attempted overdose but did not present to a healthcare facility at that

time. patient states she simply went to bed and the next morning "woke up 

feeling awful". 





PD PAST MEDICAL HISTORY





- Past Medical History


Past Medical History: Yes


Cardiovascular: Hypertension


Respiratory: Asthma


Neuro: Migraines, Seizure disorder, Tremors


Endocrine/Autoimmune: None


GI: None


GYN: None


: None


HEENT: None


Psych: Depression, Anxiety, Other


Musculoskeletal: Fibromyalgia


Derm: None





- Past Surgical History


Past Surgical History: Yes


Cardiovascular: Other





- Present Medications


Home Medications: 


                                Ambulatory Orders











 Medication  Instructions  Recorded  Confirmed


 


Topiramate 50 mg PO DAILY 03/08/22 10/11/23


 


LORazepam [Ativan] 1 mg PO PRN PRN 04/08/22 10/11/23


 


Albuterol Sulfate 1.25 mg IH DAILY 10/11/23 10/11/23


 


DULoxetine [Cymbalta] 30 mg PO DAILY 10/11/23 10/11/23


 


Eletriptan HBr [Relpax] 20 mg PO DAILY PRN 10/11/23 10/11/23


 


Fluticasone Propion/Salmeterol 1 puffs IH BID 10/11/23 10/11/23





[Fluticasone-Salmeterol 230-21]   


 


Lisinopril [Zestril] 10 mg PO DAILY 10/11/23 10/11/23


 


Montelukast [Singulair] 10 mg PO QPM 10/11/23 10/11/23


 


Tiotropium Bromide [Spiriva 1 puffs IH DAILY PM 10/11/23 10/11/23





Respimat]   


 


lamoTRIgine [Lamictal Xr] 250 mg PO BID 10/11/23 10/11/23


 


traZODone [Desyrel] 50 mg PO HS 10/11/23 10/11/23














- Allergies


Allergies/Adverse Reactions: 


                                    Allergies











Allergy/AdvReac Type Severity Reaction Status Date / Time


 


Sulfa (Sulfonamide Allergy  Anaphylaxis Verified 10/11/23 20:10





Antibiotics)     














- Social History


Does the pt smoke?: No


Smoking Status: Never smoker


Does the pt drink ETOH?: Yes


ETOH Use: Other


Does the pt have substance abuse?: No





- Immunizations


Immunizations are current?: Yes





PD ED PE NORMAL





- Vitals


Vital signs reviewed: Yes





- General


General: Alert and oriented X 3, No acute distress, Well developed/nourished





- HEENT


HEENT: Atraumatic, PERRL, EOMI





- Cardiac


Cardiac: RRR





- Respiratory


Respiratory: No respiratory distress, Clear bilaterally





- Derm


Derm: Normal color, Warm and dry





- Neuro


Neuro: Alert and oriented X 3, No motor deficit, No sensory deficit





- Psych


Psych: Other (depressed mood, tearful affect)





Results





- Vitals


Vitals: 


                               Vital Signs - 24 hr











  10/11/23 10/12/23 10/12/23





  20:11 02:56 03:45


 


Temperature 37 C 36.8 C 


 


Heart Rate 83 75 71


 


Respiratory 18 16 18





Rate   


 


Blood Pressure 116/61 118/78 


 


O2 Saturation 100 100 








                                     Oxygen











O2 Source                      Room air

















- Labs


Labs: 


                                Laboratory Tests











  10/11/23 10/11/23 10/11/23





  20:23 20:25 20:25


 


WBC   4.3 L 


 


RBC   3.58 L 


 


Hgb   12.6 


 


Hct   38.4 


 


MCV   107.3 H 


 


MCH   35.2 H 


 


MCHC   32.8 


 


RDW   13.5 


 


Plt Count   79 L 


 


MPV   8.5 


 


Neut # (Auto)   3.1 


 


Lymph # (Auto)   0.8 L 


 


Mono # (Auto)   0.4 


 


Eos # (Auto)   0.0 


 


Baso # (Auto)   0.0 


 


Absolute Nucleated RBC   0.00 


 


Nucleated RBC %   0.0 


 


Sodium    139


 


Potassium    4.0


 


Chloride    104


 


Carbon Dioxide    25


 


Anion Gap    10.0


 


BUN    17


 


Creatinine    0.9


 


Estimated GFR (MDRD)    66 L


 


Glucose    92


 


Calcium    10.4 H


 


Total Bilirubin    1.0


 


AST    81 H


 


ALT    47


 


Alkaline Phosphatase    228 H


 


Total Protein    7.8


 


Albumin    4.5


 


Globulin    3.3


 


Albumin/Globulin Ratio    1.4


 


Lipase    94 H


 


TSH    1.23


 


Urine Color  YELLOW  


 


Urine Clarity  CLEAR  


 


Urine pH  6.5  


 


Ur Specific Gravity  1.020  


 


Urine Protein  NEGATIVE  


 


Urine Glucose (UA)  NEGATIVE  


 


Urine Ketones  NEGATIVE  


 


Urine Occult Blood  NEGATIVE  


 


Urine Nitrite  NEGATIVE  


 


Urine Bilirubin  NEGATIVE  


 


Urine Urobilinogen  1 (NORMAL)  


 


Ur Leukocyte Esterase  NEGATIVE  


 


Ur Microscopic Review  NOT INDICATED  


 


Urine Culture Comments  NOT INDICATED  


 


Urine HCG, Qual  NEGATIVE  


 


Salicylates    < 1.5


 


Urine Opiates Screen  NEGATIVE  


 


Ur Oxycodone Screen  POSITIVE H  


 


Urine Methadone Screen  NEGATIVE  


 


Ur Propoxyphene Screen  NEGATIVE  


 


Acetaminophen    < 0.1


 


Ur Barbiturates Screen  NEGATIVE  


 


Ur Tricyclics Screen  NEGATIVE  


 


Ur Phencyclidine Scrn  NEGATIVE  


 


Ur Amphetamine Screen  NEGATIVE  


 


U Methamphetamines Scrn  NEGATIVE  


 


U Benzodiazepines Scrn  POSITIVE H  


 


Urine Cocaine Screen  NEGATIVE  


 


U Cannabinoids Screen  NEGATIVE  


 


Ethyl Alcohol    16.0


 


SARS-CoV-2 (PCR)   














  10/12/23





  00:15


 


WBC 


 


RBC 


 


Hgb 


 


Hct 


 


MCV 


 


MCH 


 


MCHC 


 


RDW 


 


Plt Count 


 


MPV 


 


Neut # (Auto) 


 


Lymph # (Auto) 


 


Mono # (Auto) 


 


Eos # (Auto) 


 


Baso # (Auto) 


 


Absolute Nucleated RBC 


 


Nucleated RBC % 


 


Sodium 


 


Potassium 


 


Chloride 


 


Carbon Dioxide 


 


Anion Gap 


 


BUN 


 


Creatinine 


 


Estimated GFR (MDRD) 


 


Glucose 


 


Calcium 


 


Total Bilirubin 


 


AST 


 


ALT 


 


Alkaline Phosphatase 


 


Total Protein 


 


Albumin 


 


Globulin 


 


Albumin/Globulin Ratio 


 


Lipase 


 


TSH 


 


Urine Color 


 


Urine Clarity 


 


Urine pH 


 


Ur Specific Gravity 


 


Urine Protein 


 


Urine Glucose (UA) 


 


Urine Ketones 


 


Urine Occult Blood 


 


Urine Nitrite 


 


Urine Bilirubin 


 


Urine Urobilinogen 


 


Ur Leukocyte Esterase 


 


Ur Microscopic Review 


 


Urine Culture Comments 


 


Urine HCG, Qual 


 


Salicylates 


 


Urine Opiates Screen 


 


Ur Oxycodone Screen 


 


Urine Methadone Screen 


 


Ur Propoxyphene Screen 


 


Acetaminophen 


 


Ur Barbiturates Screen 


 


Ur Tricyclics Screen 


 


Ur Phencyclidine Scrn 


 


Ur Amphetamine Screen 


 


U Methamphetamines Scrn 


 


U Benzodiazepines Scrn 


 


Urine Cocaine Screen 


 


U Cannabinoids Screen 


 


Ethyl Alcohol 


 


SARS-CoV-2 (PCR)  NOT DETECTED














PD Medical Decision Making





- ED course


ED course: 





48yF presents to the ED with depression and SI with attempted overdose yesterday

 evening per her report. denies taking any pills tonight and her medical workup 

is largely unremarkable except for mildly elevated alcohol level, mild elevated 

lipase and ALP, possibly etoh related. d/w patient and we will now go forward 

with telepsychiatry evaluation. patient is denying active SI/HI/AVH at this 

time.





telepsych recommends voluntary IPP hospitalization. will look for beds.





Bed obtained at inpatient psychiatric care facility for voluntary 

hospitalization. meds ordered per telepsych instructions.





Departure





- Departure


Disposition: [facility]/Unit DC/Xfer


Clinical Impression: 


 Depression, Suicide attempt by drug overdose





Forms:  PCP List

## 2023-10-12 VITALS — DIASTOLIC BLOOD PRESSURE: 63 MMHG | SYSTOLIC BLOOD PRESSURE: 109 MMHG | OXYGEN SATURATION: 96 %

## 2024-09-02 ENCOUNTER — HOSPITAL ENCOUNTER (INPATIENT)
Dept: HOSPITAL 76 - ED | Age: 49
LOS: 4 days | Discharge: HOME | DRG: 917 | End: 2024-09-06
Attending: PHYSICIAN ASSISTANT | Admitting: FAMILY MEDICINE
Payer: COMMERCIAL

## 2024-09-02 DIAGNOSIS — T43.012A: Primary | ICD-10-CM

## 2024-09-02 DIAGNOSIS — E87.6: ICD-10-CM

## 2024-09-02 DIAGNOSIS — G43.909: ICD-10-CM

## 2024-09-02 DIAGNOSIS — E83.42: ICD-10-CM

## 2024-09-02 DIAGNOSIS — G92.8: ICD-10-CM

## 2024-09-02 DIAGNOSIS — D61.818: ICD-10-CM

## 2024-09-02 DIAGNOSIS — I16.0: ICD-10-CM

## 2024-09-02 DIAGNOSIS — F10.239: ICD-10-CM

## 2024-09-02 DIAGNOSIS — G40.909: ICD-10-CM

## 2024-09-02 DIAGNOSIS — E87.1: ICD-10-CM

## 2024-09-02 DIAGNOSIS — F32.9: ICD-10-CM

## 2024-09-02 DIAGNOSIS — D69.3: ICD-10-CM
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
## 2024-09-02 DIAGNOSIS — I10: ICD-10-CM

## 2024-09-02 DIAGNOSIS — F41.9: ICD-10-CM

## 2024-09-02 LAB
ALBUMIN DIAFP-MCNC: 3.8 G/DL (ref 3.2–5.5)
ALBUMIN DIAFP-MCNC: 4.2 G/DL (ref 3.2–5.5)
ALBUMIN/GLOB SERPL: 1.4 {RATIO} (ref 1–2.2)
ALBUMIN/GLOB SERPL: 1.4 {RATIO} (ref 1–2.2)
ALP SERPL-CCNC: 149 IU/L (ref 42–121)
ALP SERPL-CCNC: 173 IU/L (ref 42–121)
ALT SERPL W P-5'-P-CCNC: 35 IU/L (ref 10–60)
ALT SERPL W P-5'-P-CCNC: 42 IU/L (ref 10–60)
AMPHET UR QL SCN: NEGATIVE
ANION GAP SERPL CALCULATED.4IONS-SCNC: 11 MMOL/L (ref 6–13)
ANION GAP SERPL CALCULATED.4IONS-SCNC: 9 MMOL/L (ref 6–13)
APAP SERPL-MCNC: 0.2 UG/ML
AST SERPL W P-5'-P-CCNC: 40 IU/L (ref 10–42)
AST SERPL W P-5'-P-CCNC: 47 IU/L (ref 10–42)
BARBITURATES UR QL SCN>300 NG/ML: NEGATIVE
BASOPHILS # BLD MANUAL: 0 10^3/UL (ref 0–0.1)
BASOPHILS NFR BLD AUTO: 0 %
BASOPHILS NFR BLD AUTO: 0 %
BASOPHILS NFR BLD AUTO: 0 10^3/UL (ref 0–0.1)
BENZODIAZ UR QL SCN: NEGATIVE
BILIRUB BLD-MCNC: 0.6 MG/DL (ref 0.2–1)
BILIRUB BLD-MCNC: 0.8 MG/DL (ref 0.2–1)
BUN SERPL-MCNC: 10 MG/DL (ref 6–20)
BUN SERPL-MCNC: 12 MG/DL (ref 6–20)
BUPRENORPHINE UR QL SCN: NEGATIVE
CALCIUM UR-MCNC: 8 MG/DL (ref 8.5–10.3)
CALCIUM UR-MCNC: 8.8 MG/DL (ref 8.5–10.3)
CHLORIDE SERPL-SCNC: 105 MMOL/L (ref 101–111)
CHLORIDE SERPL-SCNC: 98 MMOL/L (ref 101–111)
CHOLEST SERPL-MCNC: 166 MG/DL
CK SERPL-CCNC: 67 IU/L (ref 30–223)
CLARITY UR REFRACT.AUTO: CLEAR
CO2 SERPL-SCNC: 21 MMOL/L (ref 21–32)
CO2 SERPL-SCNC: 23 MMOL/L (ref 21–32)
COCAINE UR-SCNC: NEGATIVE UMOL/L
CREAT SERPLBLD-SCNC: 0.7 MG/DL (ref 0.6–1.3)
CREAT SERPLBLD-SCNC: 0.9 MG/DL (ref 0.6–1.3)
EOSINOPHIL # BLD AUTO: 0 10^3/UL (ref 0–0.7)
EOSINOPHIL # BLD MANUAL: 0 10^3/UL (ref 0–0.7)
EOSINOPHIL NFR BLD AUTO: 0 %
EOSINOPHIL NFR BLD AUTO: 0 %
ERYTHROCYTE [DISTWIDTH] IN BLOOD BY AUTOMATED COUNT: 14.2 % (ref 12–15)
ERYTHROCYTE [DISTWIDTH] IN BLOOD BY AUTOMATED COUNT: 14.3 % (ref 12–15)
EST. AVERAGE GLUCOSE BLD GHB EST-MCNC: 91 MG/DL (ref 70–100)
ETHANOL BLD-MCNC: 250.6 MG/DL
GFRSERPLBLD MDRD-ARVRAT: 66 ML/MIN/{1.73_M2} (ref 89–?)
GFRSERPLBLD MDRD-ARVRAT: 88 ML/MIN/{1.73_M2} (ref 89–?)
GLOBULIN SER-MCNC: 2.7 G/DL (ref 2.1–4.2)
GLOBULIN SER-MCNC: 3.1 G/DL (ref 2.1–4.2)
GLUCOSE SERPL-MCNC: 104 MG/DL (ref 74–104)
GLUCOSE SERPL-MCNC: 92 MG/DL (ref 74–104)
GLUCOSE UR QL STRIP.AUTO: NEGATIVE MG/DL
HBA1C MFR BLD HPLC: 4.8 % (ref 4.27–6.07)
HCT VFR BLD AUTO: 31.5 % (ref 37–47)
HCT VFR BLD AUTO: 35.5 % (ref 37–47)
HDLC SERPL-MCNC: 45 MG/DL
HDLC SERPL: 3.7 {RATIO} (ref ?–4.4)
HGB UR QL STRIP: 11 G/DL (ref 12–16)
HGB UR QL STRIP: 12.4 G/DL (ref 12–16)
KETONES UR QL STRIP.AUTO: NEGATIVE MG/DL
LDLC SERPL CALC-MCNC: 80 MG/DL
LDLC/HDLC SERPL: 1.8 {RATIO} (ref ?–4.4)
LIPASE SERPL-CCNC: 115 U/L (ref 11–82)
LYMPH ABN NFR BLD MANUAL: 0 %
LYMPHOBLASTS # BLD: 34 %
LYMPHOCYTES # BLD MANUAL: 1 10^3/UL (ref 1.5–3.5)
LYMPHOCYTES # SPEC AUTO: 1.6 10^3/UL (ref 1.5–3.5)
LYMPHOCYTES NFR BLD AUTO: 32.4 %
LYMPHOCYTES NFR BLD AUTO: 34.3 %
MAGNESIUM SERPL-MCNC: 1.6 MG/DL (ref 1.7–2.3)
MAGNESIUM SERPL-MCNC: 1.7 MG/DL (ref 1.7–2.3)
MANUAL DIF COMMENT BLD-IMP: (no result)
MCH RBC QN AUTO: 36.7 PG (ref 27–31)
MCH RBC QN AUTO: 36.9 PG (ref 27–31)
MCHC RBC AUTO-ENTMCNC: 34.9 G/DL (ref 32–36)
MCHC RBC AUTO-ENTMCNC: 34.9 G/DL (ref 32–36)
MCV RBC AUTO: 105 FL (ref 81–99)
MCV RBC AUTO: 105.7 FL (ref 81–99)
METHADONE UR QL SCN: NEGATIVE
METHAMPHET UR QL SCN: NEGATIVE
MONOCYTES # BLD AUTO: 0.5 10^3/UL (ref 0–1)
MONOCYTES # BLD MANUAL: 0.1 10^3/UL (ref 0–1)
MONOCYTES NFR BLD AUTO: 10.2 %
MONOCYTES NFR BLD AUTO: 7.9 %
NEUTROPHILS # BLD AUTO: 2.7 10^3/UL (ref 1.5–6.6)
NEUTROPHILS # SNV AUTO: 2.8 X10^3/UL (ref 4.8–10.8)
NEUTROPHILS # SNV AUTO: 4.8 X10^3/UL (ref 4.8–10.8)
NEUTROPHILS NFR BLD AUTO: 56.7 %
NEUTROPHILS NFR BLD AUTO: 57.2 %
NEUTROPHILS NFR BLD MANUAL: 1.7 10^3/UL (ref 1.5–6.6)
NEUTS BAND NFR BLD: 0 %
NITRITE UR QL STRIP.AUTO: NEGATIVE
NRBC # BLD AUTO: 0 /100WBC
NRBC # BLD AUTO: 0 X10^3/UL
OPIATES UR QL SCN: NEGATIVE
PDW BLD AUTO: 9.3 FL (ref 7.9–10.8)
PDW BLD AUTO: 9.9 FL (ref 7.9–10.8)
PH UR STRIP.AUTO: 6.5 PH (ref 5–7.5)
PLAT MORPH BLD: (no result)
PLATELET # BLD: 39 10^3/UL (ref 130–450)
PLATELET # BLD: 54 10^3/UL (ref 130–450)
PLATELET BLD QL SMEAR: (no result)
POTASSIUM SERPL-SCNC: 3.3 MMOL/L (ref 3.5–4.5)
POTASSIUM SERPL-SCNC: 3.4 MMOL/L (ref 3.5–4.5)
PROT SPEC-MCNC: 6.5 G/DL (ref 6.4–8.9)
PROT SPEC-MCNC: 7.3 G/DL (ref 6.4–8.9)
PROT UR STRIP.AUTO-MCNC: NEGATIVE MG/DL
RBC # UR STRIP.AUTO: NEGATIVE /UL
RBC MAR: 2.98 10^6/UL (ref 4.2–5.4)
RBC MAR: 3.38 10^6/UL (ref 4.2–5.4)
RBC MORPH BLD: (no result)
SALICYLATES SERPL-MCNC: < 1.5 MG/DL
SODIUM SERPLBLD-SCNC: 132 MMOL/L (ref 135–145)
SODIUM SERPLBLD-SCNC: 135 MMOL/L (ref 135–145)
SP GR UR STRIP.AUTO: <=1.005 (ref 1–1.03)
THC UR QL SCN: NEGATIVE
TRIGL P FAST SERPL-MCNC: 204 MG/DL
TSH SERPL-ACNC: 0.99 UIU/ML (ref 0.34–5.6)
TSH SERPL-ACNC: 1.96 UIU/ML (ref 0.34–5.6)
UROBILINOGEN UR QL STRIP.AUTO: (no result) E.U./DL
UROBILINOGEN UR STRIP.AUTO-MCNC: NEGATIVE MG/DL
VLDLC SERPL-SCNC: 41 MG/DL

## 2024-09-02 PROCEDURE — 84443 ASSAY THYROID STIM HORMONE: CPT

## 2024-09-02 PROCEDURE — 80306 DRUG TEST PRSMV INSTRMNT: CPT

## 2024-09-02 PROCEDURE — 80053 COMPREHEN METABOLIC PANEL: CPT

## 2024-09-02 PROCEDURE — 83690 ASSAY OF LIPASE: CPT

## 2024-09-02 PROCEDURE — 51701 INSERT BLADDER CATHETER: CPT

## 2024-09-02 PROCEDURE — 80143 DRUG ASSAY ACETAMINOPHEN: CPT

## 2024-09-02 PROCEDURE — 84100 ASSAY OF PHOSPHORUS: CPT

## 2024-09-02 PROCEDURE — 80061 LIPID PANEL: CPT

## 2024-09-02 PROCEDURE — 80179 DRUG ASSAY SALICYLATE: CPT

## 2024-09-02 PROCEDURE — 94640 AIRWAY INHALATION TREATMENT: CPT

## 2024-09-02 PROCEDURE — 87086 URINE CULTURE/COLONY COUNT: CPT

## 2024-09-02 PROCEDURE — 81003 URINALYSIS AUTO W/O SCOPE: CPT

## 2024-09-02 PROCEDURE — 85025 COMPLETE CBC W/AUTO DIFF WBC: CPT

## 2024-09-02 PROCEDURE — 99284 EMERGENCY DEPT VISIT MOD MDM: CPT

## 2024-09-02 PROCEDURE — 96365 THER/PROPH/DIAG IV INF INIT: CPT

## 2024-09-02 PROCEDURE — 80069 RENAL FUNCTION PANEL: CPT

## 2024-09-02 PROCEDURE — 36415 COLL VENOUS BLD VENIPUNCTURE: CPT

## 2024-09-02 PROCEDURE — 83721 ASSAY OF BLOOD LIPOPROTEIN: CPT

## 2024-09-02 PROCEDURE — 81001 URINALYSIS AUTO W/SCOPE: CPT

## 2024-09-02 PROCEDURE — 85610 PROTHROMBIN TIME: CPT

## 2024-09-02 PROCEDURE — 82077 ASSAY SPEC XCP UR&BREATH IA: CPT

## 2024-09-02 PROCEDURE — 82550 ASSAY OF CK (CPK): CPT

## 2024-09-02 PROCEDURE — 80048 BASIC METABOLIC PNL TOTAL CA: CPT

## 2024-09-02 PROCEDURE — 99285 EMERGENCY DEPT VISIT HI MDM: CPT

## 2024-09-02 PROCEDURE — 83036 HEMOGLOBIN GLYCOSYLATED A1C: CPT

## 2024-09-02 PROCEDURE — 93005 ELECTROCARDIOGRAM TRACING: CPT

## 2024-09-02 PROCEDURE — 83735 ASSAY OF MAGNESIUM: CPT

## 2024-09-02 PROCEDURE — 96361 HYDRATE IV INFUSION ADD-ON: CPT

## 2024-09-02 RX ADMIN — ALBUTEROL SULFATE SCH: 2.5 SOLUTION RESPIRATORY (INHALATION) at 08:18

## 2024-09-02 RX ADMIN — SODIUM CHLORIDE, PRESERVATIVE FREE PRN ML: 5 INJECTION INTRAVENOUS at 22:24

## 2024-09-02 RX ADMIN — BUDESONIDE SCH: 0.5 SUSPENSION RESPIRATORY (INHALATION) at 08:17

## 2024-09-02 RX ADMIN — MAGNESIUM SULFATE HEPTAHYDRATE ONE MLS/HR: 2 INJECTION, SOLUTION INTRAVENOUS at 15:26

## 2024-09-02 RX ADMIN — FORMOTEROL FUMARATE DIHYDRATE SCH: 20 SOLUTION RESPIRATORY (INHALATION) at 08:18

## 2024-09-02 RX ADMIN — IPRATROPIUM BROMIDE SCH: 0.5 SOLUTION RESPIRATORY (INHALATION) at 08:18

## 2024-09-02 RX ADMIN — FAMOTIDINE SCH MG: 10 INJECTION INTRAVENOUS at 09:28

## 2024-09-02 RX ADMIN — SODIUM CHLORIDE, POTASSIUM CHLORIDE, SODIUM LACTATE AND CALCIUM CHLORIDE SCH MLS/HR: 600; 310; 30; 20 INJECTION, SOLUTION INTRAVENOUS at 08:43

## 2024-09-02 RX ADMIN — POTASSIUM CHLORIDE SCH MLS/HR: 7.46 INJECTION, SOLUTION INTRAVENOUS at 16:41

## 2024-09-02 RX ADMIN — SODIUM CHLORIDE STA MLS/HR: 9 INJECTION, SOLUTION INTRAVENOUS at 02:48

## 2024-09-02 RX ADMIN — SODIUM CHLORIDE STA MLS/HR: 9 INJECTION, SOLUTION INTRAVENOUS at 03:25

## 2024-09-02 RX ADMIN — SODIUM CHLORIDE, PRESERVATIVE FREE SCH ML: 5 INJECTION INTRAVENOUS at 08:44

## 2024-09-02 NOTE — ED PHYSICIAN DOCUMENTATION
ED Addendum





- Addendum


Addendum: 





09/02/24 19:28


Patient was seen by hospitalist team after change of shift. Pt was being more 

rousable and having good vitals, sats, resp status. Change of status for patient

from ICU to Med/Surg due to stability.

## 2024-09-02 NOTE — MISCELLANEOUS PROVIDER NOTE
Miscellaneous Provider Note





- -


Note: 





 admitted this morning by telemedicine.  On further interview with her , 

there is concern for amitriptyline overdose.  He showed an empty bottle.  This 

was filled in March for a 30-day supply, so unsure if she is actively taking 

this.  UDS ordered to determine whether or not amitriptyline is in her system.  

This will not affect her immediate disposition, but it will necessitate a 

conversation with poison control

## 2024-09-02 NOTE — ED PHYSICIAN DOCUMENTATION
History of Present Illness





- Stated complaint


Stated Complaint: POSS OD





- Chief complaint


Chief Complaint: MHE





- History obtained from


History obtained from: Family ()





- Additonal information


Additional information: 





49-year-old woman with history of depression, anxiety, presents status post 

heavy alcohol use with vodka tonight as well as multiple pills of Ativan 0.5mg. 

 saw her take numerous Ativan 0.5 mg tonight and then vomit immediately 

after.  He was able to get 8 pills away from her. He states that she may have 

taken as many as 20 pills.  Her prescription bottle states that she filled the 

ativan pills in April 2024 and  states she's been taking as needed. He is

unsure about any coingestions. Patient is sedated appearing upon arrival. 

history limited by this





PD PAST MEDICAL HISTORY





- Past Medical History


Cardiovascular: Hypertension


Respiratory: Asthma


Neuro: Migraines, Seizure disorder, Tremors


Endocrine/Autoimmune: None


GI: None


GYN: None


: None


HEENT: None


Psych: Depression, Anxiety, Other


Musculoskeletal: Fibromyalgia


Derm: None





- Past Surgical History


Past Surgical History: Yes


Cardiovascular: Other





- Present Medications


Home Medications: 


                                Ambulatory Orders











 Medication  Instructions  Recorded  Confirmed


 


Topiramate 50 mg PO DAILY 03/08/22 10/11/23


 


LORazepam [Ativan] 1 mg PO PRN PRN 04/08/22 10/11/23


 


Albuterol Sulfate 1.25 mg IH DAILY 10/11/23 10/11/23


 


DULoxetine [Cymbalta] 30 mg PO DAILY 10/11/23 10/11/23


 


Eletriptan HBr [Relpax] 20 mg PO DAILY PRN 10/11/23 10/11/23


 


Fluticasone Propion/Salmeterol 1 puffs IH BID 10/11/23 10/11/23





[Fluticasone-Salmeterol 230-21]   


 


Lisinopril [Zestril] 10 mg PO DAILY 10/11/23 10/11/23


 


Montelukast [Singulair] 10 mg PO QPM 10/11/23 10/11/23


 


Tiotropium Bromide [Spiriva 1 puffs IH DAILY PM 10/11/23 10/11/23





Respimat]   


 


lamoTRIgine [Lamictal Xr] 250 mg PO BID 10/11/23 10/11/23


 


traZODone [Desyrel] 50 mg PO HS 10/11/23 10/11/23














- Allergies


Allergies/Adverse Reactions: 


                                    Allergies











Allergy/AdvReac Type Severity Reaction Status Date / Time


 


Sulfa (Sulfonamide Allergy  Anaphylaxis Verified 10/11/23 20:10





Antibiotics)     














- Social History


Does the pt smoke?: No


Smoking Status: Never smoker


Does the pt drink ETOH?: Yes


Does the pt have substance abuse?: No





- Immunizations


Immunizations are current?: Yes





PD ED PE NORMAL





- Vitals


Vital signs reviewed: Yes





- General


General: No acute distress, Well developed/nourished, Other (sedated appearing, 

eye opening to verbal cues, intermittently squeezing hand on command. nonverbal)





- HEENT


HEENT: Atraumatic, PERRL, EOMI, Moist mucous membranes, Pharynx benign





- Neck


Neck: Supple, no meningeal sign





- Cardiac


Cardiac: RRR





- Respiratory


Respiratory: No respiratory distress, Clear bilaterally





- Abdomen


Abdomen: Non tender, Non distended





- Derm


Derm: Normal color, Warm and dry





- Extremities


Extremities: No deformity





- Neuro


Eye Opening: To Voice


Motor: Obeys Commands


Verbal: None


GCS Score: 10





- Psych


Psych: Other (sedated appearing)





Results





- Vitals


Vitals: 


                               Vital Signs - 24 hr











  09/02/24 09/02/24 09/02/24





  02:19 02:35 02:53


 


Temperature 36.0 C L  


 


Heart Rate 79 93 88


 


Respiratory 18 18 14





Rate   


 


Blood Pressure 137/82 H 137/82 H 129/84 H


 


O2 Saturation 95 98 98


 


If not protocol  2 2





: Oxygen Flow,   





liters/minute   








                                     Oxygen











O2 Source                      Nasal cannula

















- EKG (time done)


  ** 0244


EKG releavant findings:: 


EKG personally interpreted by author of this note. Relevant findings are: 





Rate: Rate (enter#) (87)


Rhythm: NSR


Axis: Normal


Intervals: Normal MO


QRS: Normal


Ischemia: Normal ST segments





- Labs


Labs: 


                                Laboratory Tests











  09/02/24 09/02/24





  02:33 02:33


 


WBC  4.8 


 


RBC  3.38 L 


 


Hgb  12.4 


 


Hct  35.5 L 


 


MCV  105.0 H 


 


MCH  36.7 H 


 


MCHC  34.9 


 


RDW  14.2 


 


Plt Count  54 L 


 


MPV  9.9 


 


Neut # (Auto)  2.7 


 


Lymph # (Auto)  1.6 


 


Mono # (Auto)  0.5 


 


Eos # (Auto)  0.0 


 


Baso # (Auto)  0.0 


 


Absolute Nucleated RBC  0.00 


 


Nucleated RBC %  0.0 


 


Sodium   132 L


 


Potassium   3.3 L


 


Chloride   98 L


 


Carbon Dioxide   23


 


Anion Gap   11.0


 


BUN   12


 


Creatinine   0.9


 


Estimated GFR (MDRD)   66 L


 


Glucose   104


 


Calcium   8.8


 


Magnesium   1.7


 


Total Bilirubin   0.8


 


AST   47 H


 


ALT   42


 


Alkaline Phosphatase   173 H


 


Total Creatine Kinase   67


 


Total Protein   7.3


 


Albumin   4.2


 


Globulin   3.1


 


Albumin/Globulin Ratio   1.4


 


Lipase   115 H


 


Acetaminophen   0.2














PD Medical Decision Making





- ED course


ED course: 





49yF presents s/p overdose of ativan and alcohol tonight. patient has prior 

history of suicidal ideation per spouse. Plan to admit for monitoring for 

overdose. 





Departure





- Departure


Forms:  PCP List

## 2024-09-02 NOTE — HISTORY & PHYSICAL EXAMINATION
Chief Complaint





- Chief Complaint


Chief Complaint: od, lethargy





History of Present Illness





- History of Present Illness


HPI Comment/Other: 


pt with h/o depression, anxiety and previous SI brought to hospital s/p OD on 

approx 20 pills, per . pt also consumed large amounts of etoh.   

denies having an altercation but states he had told her she needs to sleep.  she

also reportedly vomited.  no reports of suicide per .











History





- Past Medical History


Cardiovascular: reports: Hypertension


Respiratory: reports: Asthma


Neuro: reports: Migraines, Seizure disorder, Tremors


Endocrine/Autoimmune: reports: None


GI: reports: None


GYN: reports: None


: reports: None


HEENT: reports: None


Psych: reports: Depression, Anxiety, Other


Musculoskeletal: reports: Fibromyalgia


Derm: reports: None


MRSA Hx?: No





- Past Surgical History


Cardiovascular: reports: Other





- Family & Social History


Family History Comment/Other: She has 3 children who are adults and have moved 

out of the house. 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


Living Situation: With spouse/s.o.


Social History Notes: She admits to alcohol abuse from age 16 until 2013, when 

her seizure disorder was discovered.  She was sober through all her pregnancies 

she reported and currently has a rare beer or cocktail. She is a non-smoker and 

never smoked.





- Substance History


Use: Uses substance without health or social issues: NONE, Alcohol





Meds/Allgy





- Home Medications


Home Medications: 


                                Ambulatory Orders











 Medication  Instructions  Recorded  Confirmed


 


Topiramate 50 mg PO DAILY 03/08/22 10/11/23


 


LORazepam [Ativan] 1 mg PO PRN PRN 04/08/22 10/11/23


 


Albuterol Sulfate 1.25 mg IH DAILY 10/11/23 10/11/23


 


DULoxetine [Cymbalta] 30 mg PO DAILY 10/11/23 10/11/23


 


Eletriptan HBr [Relpax] 20 mg PO DAILY PRN 10/11/23 10/11/23


 


Fluticasone Propion/Salmeterol 1 puffs IH BID 10/11/23 10/11/23





[Fluticasone-Salmeterol 230-21]   


 


Lisinopril [Zestril] 10 mg PO DAILY 10/11/23 10/11/23


 


Montelukast [Singulair] 10 mg PO QPM 10/11/23 10/11/23


 


Tiotropium Bromide [Spiriva 1 puffs IH DAILY PM 10/11/23 10/11/23





Respimat]   


 


lamoTRIgine [Lamictal Xr] 250 mg PO BID 10/11/23 10/11/23


 


traZODone [Desyrel] 50 mg PO HS 10/11/23 10/11/23














- Allergies


Allergies/Adverse Reactions: 


                                    Allergies











Allergy/AdvReac Type Severity Reaction Status Date / Time


 


Sulfa (Sulfonamide Allergy  Anaphylaxis Verified 10/11/23 20:10





Antibiotics)     














Review of Systems





- Other Findings


Other Findings: 





pt somnolent / lethargic. unable to obtain at this time





Exam





- Vital Signs


Vital Signs: 


                                Vital Signs x48h











  Temp Pulse Resp BP Pulse Ox O2 Flow Rate


 


 09/02/24 05:30   85  16  125/88 H  100  2


 


 09/02/24 05:00   78  16  100/70  100  2


 


 09/02/24 04:30   81  16  103/70  99  2


 


 09/02/24 04:00   83  16  107/67  99  2


 


 09/02/24 03:25   83  16  111/72  99 


 


 09/02/24 02:53   88  14  129/84 H  98  2


 


 09/02/24 02:35   93  18  137/82 H  98  2


 


 09/02/24 02:19  36.0 C L  79  18  137/82 H  95 














- Physical Exam


Comments/Other: 





gen - asleep / lethargic.  at bedside


heent - nc/at


heart - per ed charting


lungs - breathing unlabored


abd - soft


msk - no acute abnl noted





Conclusion/Plan





- Lab Results


Fish Bones: 


                                 09/02/24 02:33





                                 09/02/24 02:33





- Other


Other Results/Comments: 





pt with - 





- drug overdose


   multiple substances, but quantity unclear


   also with etoh intoxication


   maintaining airway, somnolent


   not a suicide attempt per 


- toxic metabolic encephalopathy


   d/t above


   maintaining airway, somnolent


- h/o suicide attempt


    denies that this was an attempt nor did she verbalize anything

## 2024-09-02 NOTE — PHARMACY PROGRESS NOTE
- Best Possible Medication History


Admit Date and Time: 09/02/24 0446


Processed by: Pharmacy


Medications reviewed in ED?: Yes


Patient Interview: Pt unable to participate


Secondary Source(s): Spouse/Significant other, Pharmacy records, Insurance rec

ords





As the person ultimately responsible for medication therapy, providers are able 

to order a medication from an existing home medication list in [facility] via the 

"Reconcile Routine" prior to Confirmation of that medication by support staff. 

Such practice is discouraged except when the physician, in their clinical 

judgment, deems that a medical need exists for a medication without regard to 

previous use.

## 2024-09-03 LAB
ALBUMIN DIAFP-MCNC: 4.2 G/DL (ref 3.2–5.5)
ALBUMIN/GLOB SERPL: 1.3 {RATIO} (ref 1–2.2)
ALP SERPL-CCNC: 126 IU/L (ref 42–121)
ALT SERPL W P-5'-P-CCNC: 32 IU/L (ref 10–60)
ANION GAP SERPL CALCULATED.4IONS-SCNC: 11 MMOL/L (ref 6–13)
ANION GAP SERPL CALCULATED.4IONS-SCNC: 9 MMOL/L (ref 6–13)
AST SERPL W P-5'-P-CCNC: 32 IU/L (ref 10–42)
BASOPHILS NFR BLD AUTO: 0 %
BASOPHILS NFR BLD AUTO: 0 10^3/UL (ref 0–0.1)
BASOPHILS NFR BLD AUTO: 0 10^3/UL (ref 0–0.1)
BASOPHILS NFR BLD AUTO: 0.2 %
BILIRUB BLD-MCNC: 1.4 MG/DL (ref 0.2–1)
BUN SERPL-MCNC: 10 MG/DL (ref 6–20)
BUN SERPL-MCNC: 11 MG/DL (ref 6–20)
CALCIUM UR-MCNC: 9.4 MG/DL (ref 8.5–10.3)
CALCIUM UR-MCNC: 9.6 MG/DL (ref 8.5–10.3)
CHLORIDE SERPL-SCNC: 105 MMOL/L (ref 101–111)
CHLORIDE SERPL-SCNC: 106 MMOL/L (ref 101–111)
CLARITY UR REFRACT.AUTO: CLEAR
CO2 SERPL-SCNC: 23 MMOL/L (ref 21–32)
CO2 SERPL-SCNC: 23 MMOL/L (ref 21–32)
CREAT SERPLBLD-SCNC: 0.8 MG/DL (ref 0.6–1.3)
CREAT SERPLBLD-SCNC: 0.8 MG/DL (ref 0.6–1.3)
EOSINOPHIL # BLD AUTO: 0 10^3/UL (ref 0–0.7)
EOSINOPHIL # BLD AUTO: 0 10^3/UL (ref 0–0.7)
EOSINOPHIL NFR BLD AUTO: 0 %
EOSINOPHIL NFR BLD AUTO: 0 %
ERYTHROCYTE [DISTWIDTH] IN BLOOD BY AUTOMATED COUNT: 14.2 % (ref 12–15)
ERYTHROCYTE [DISTWIDTH] IN BLOOD BY AUTOMATED COUNT: 14.3 % (ref 12–15)
GFRSERPLBLD MDRD-ARVRAT: 76 ML/MIN/{1.73_M2} (ref 89–?)
GFRSERPLBLD MDRD-ARVRAT: 76 ML/MIN/{1.73_M2} (ref 89–?)
GLOBULIN SER-MCNC: 3.2 G/DL (ref 2.1–4.2)
GLUCOSE SERPL-MCNC: 110 MG/DL (ref 74–104)
GLUCOSE SERPL-MCNC: 97 MG/DL (ref 74–104)
GLUCOSE UR QL STRIP.AUTO: NEGATIVE MG/DL
HCT VFR BLD AUTO: 36.2 % (ref 37–47)
HCT VFR BLD AUTO: 37.3 % (ref 37–47)
HGB UR QL STRIP: 12.8 G/DL (ref 12–16)
HGB UR QL STRIP: 13 G/DL (ref 12–16)
INR PPP: 1.3 (ref 0.8–1.2)
KETONES UR QL STRIP.AUTO: NEGATIVE MG/DL
LYMPHOCYTES # SPEC AUTO: 0.7 10^3/UL (ref 1.5–3.5)
LYMPHOCYTES # SPEC AUTO: 0.9 10^3/UL (ref 1.5–3.5)
LYMPHOCYTES NFR BLD AUTO: 16.9 %
LYMPHOCYTES NFR BLD AUTO: 18.2 %
MAGNESIUM SERPL-MCNC: 2 MG/DL (ref 1.7–2.3)
MCH RBC QN AUTO: 36.7 PG (ref 27–31)
MCH RBC QN AUTO: 37 PG (ref 27–31)
MCHC RBC AUTO-ENTMCNC: 34.9 G/DL (ref 32–36)
MCHC RBC AUTO-ENTMCNC: 35.4 G/DL (ref 32–36)
MCV RBC AUTO: 104.6 FL (ref 81–99)
MCV RBC AUTO: 105.4 FL (ref 81–99)
MONOCYTES # BLD AUTO: 0.3 10^3/UL (ref 0–1)
MONOCYTES # BLD AUTO: 0.4 10^3/UL (ref 0–1)
MONOCYTES NFR BLD AUTO: 7.7 %
MONOCYTES NFR BLD AUTO: 8.3 %
NEUTROPHILS # BLD AUTO: 2.9 10^3/UL (ref 1.5–6.6)
NEUTROPHILS # BLD AUTO: 3.4 10^3/UL (ref 1.5–6.6)
NEUTROPHILS # SNV AUTO: 3.9 X10^3/UL (ref 4.8–10.8)
NEUTROPHILS # SNV AUTO: 4.7 X10^3/UL (ref 4.8–10.8)
NEUTROPHILS NFR BLD AUTO: 72.9 %
NEUTROPHILS NFR BLD AUTO: 74.6 %
NITRITE UR QL STRIP.AUTO: NEGATIVE
NRBC # BLD AUTO: 0 /100WBC
NRBC # BLD AUTO: 0 /100WBC
NRBC # BLD AUTO: 0 X10^3/UL
NRBC # BLD AUTO: 0 X10^3/UL
PDW BLD AUTO: 9 FL (ref 7.9–10.8)
PDW BLD AUTO: 9.6 FL (ref 7.9–10.8)
PH UR STRIP.AUTO: 7.5 PH (ref 5–7.5)
PHOSPHATE BLD-MCNC: 2.7 MG/DL (ref 2.5–5)
PLATELET # BLD: 38 10^3/UL (ref 130–450)
PLATELET # BLD: 43 10^3/UL (ref 130–450)
POTASSIUM SERPL-SCNC: 3.8 MMOL/L (ref 3.5–4.5)
POTASSIUM SERPL-SCNC: 4.3 MMOL/L (ref 3.5–4.5)
PROT SPEC-MCNC: 7.4 G/DL (ref 6.4–8.9)
PROT UR STRIP.AUTO-MCNC: NEGATIVE MG/DL
PROTHROM ACT/NOR PPP: 14.2 SECS (ref 9.9–12.6)
RBC # UR STRIP.AUTO: NEGATIVE /UL
RBC MAR: 3.46 10^6/UL (ref 4.2–5.4)
RBC MAR: 3.54 10^6/UL (ref 4.2–5.4)
SODIUM SERPLBLD-SCNC: 138 MMOL/L (ref 135–145)
SODIUM SERPLBLD-SCNC: 139 MMOL/L (ref 135–145)
SP GR UR STRIP.AUTO: 1.01 (ref 1–1.03)
UROBILINOGEN UR QL STRIP.AUTO: (no result) E.U./DL
UROBILINOGEN UR STRIP.AUTO-MCNC: NEGATIVE MG/DL

## 2024-09-03 RX ADMIN — POTASSIUM CHLORIDE SCH MLS/HR: 7.46 INJECTION, SOLUTION INTRAVENOUS at 12:45

## 2024-09-03 RX ADMIN — SODIUM CHLORIDE SCH MLS/HR: 9 INJECTION, SOLUTION INTRAVENOUS at 12:46

## 2024-09-03 RX ADMIN — TOPIRAMATE SCH MG: 100 TABLET, FILM COATED ORAL at 21:45

## 2024-09-03 RX ADMIN — IPRATROPIUM BROMIDE AND ALBUTEROL SULFATE SCH ML: 2.5; .5 SOLUTION RESPIRATORY (INHALATION) at 15:26

## 2024-09-03 RX ADMIN — SODIUM CHLORIDE SCH MLS/HR: 9 INJECTION, SOLUTION INTRAVENOUS at 08:58

## 2024-09-03 NOTE — PROVIDER PROGRESS NOTE
Subjective





- Prog Note Date


Prog Note Date: 09/03/24


Prog Note Time: 11:25





- Subjective


Pt reports feeling: No change


Subjective: 


The patient was admitted overnight with an apparent drug overdose.  This is a 

possible suicide attempt.  The patient's  found an empty bottle of 

amitriptyline.  Poison control was notified and we are monitoring her with 

serial EKGs.





When I saw the patient today she was lethargic.  She would arouse to a sternal 

rub and she would mumble incoherently.  Review of systems could not be obtained








Current Medications





- Current Medications


Current Medications: 





Active Medications











Generic Name Dose Route Start Last Admin





  Trade Name Freq  PRN Reason Stop Dose Admin


 


Acetaminophen  650 mg  09/02/24 04:45 





  Acetaminophen 325 Mg Tablet  PO  





  Q6H PRN  





  Pain 1 to 4, or Fever  


 


Albuterol  1.25 mg  09/02/24 09:00  09/03/24 07:41





  Albuterol Neb 2.5 Mg/3 Ml  INH   2.5 mg





  DAILY JOHN   Administration


 


Budesonide  0.5 mg  09/02/24 07:00  09/03/24 07:41





  Budesonide 0.5 Mg/2 Ml Neb  INH   0.5 mg





  RTBID JOHN   Administration


 


Famotidine  20 mg  09/02/24 09:00  09/03/24 09:00





  Famotidine 20 Mg/2 Ml Vial  IVP   20 mg





  BID JOHN   Administration


 


Formoterol Fumarate  20 mcg  09/02/24 07:00  09/03/24 07:41





  Formoterol Fumarate Neb 20 Mcg/2 Ml  INH   20 mcg





  RTBID JOHN   Administration


 


Hydralazine HCl  10 mg  09/03/24 07:44 





  Hydralazine Inj 20 Mg/Ml Vial  IVP  





  Q6H PRN  





  Hypertensive Emergency  


 


Acetaminophen  1,000 mg in 100 mls @ 400 mls/hr  09/02/24 19:06 





  Acetaminophen  IV  





  Q6HR PRN  





  Moderate Pain (Level 4-6)  


 


Multivitamins 10 ml/ Thiamine  1,011.2 mls @ 100 mls/hr  09/03/24 12:00 





HCl 100 mg/ Folic Acid 1 mg/  IV  





Sodium Chloride  DAILY@1200 JOHN  


 


Ipratropium Bromide  0.2 mg  09/02/24 07:00  09/03/24 07:40





  Ipratropium 0.2 Mg/Ml Neb  INH   0.5 mg





  RTQ6H JOHN   Administration


 


Lisinopril  10 mg  09/02/24 09:00  09/03/24 09:02





  Lisinopril 5 Mg Tablet  PO   Not Given





  DAILY JOHN  


 


Lorazepam  1 mg  09/03/24 10:07 





  Lorazepam 2 Mg/Ml Vial  IVP  





  Q30M PRN  





  CIWA >8  





  Protocol  


 


Ondansetron HCl  4 mg  09/02/24 04:45 





  Ondansetron Odt 4 Mg Tablet  TL  





  Q6HR PRN  





  Nausea / Vomiting  


 


Sodium Chloride  10 ml  09/02/24 09:00  09/03/24 09:01





  Sodium Chloride Flush 0.9% 10 Ml Syringe  IVP   10 ml





  0100,0900,1700 JOHN   Administration


 


Sodium Chloride  10 ml  09/02/24 04:45  09/02/24 22:24





  Sodium Chloride Flush 0.9% 10 Ml Syringe  IVP   10 ml





  PRN PRN   Administration





  AS NEEDED PER PROVIDER ORDERS  








                                        





LORazepam [Ativan] 0.5 mg PO PRN PRN 04/08/22 


DULoxetine [Cymbalta] 60 mg PO DAILY 10/11/23 


Montelukast [Singulair] 10 mg PO QPM 10/11/23 


traZODone [Desyrel] 50 mg PO HS 10/11/23 


Benralizumab [Fasenra Pen] 30 mg SUBQ Q56D 09/02/24 


Cetirizine [ZyrTEC] 10 mg PO BID 09/02/24 


Fluticasone/Umeclidin/Vilanter [Trelegy Ellipta 200-62.5-25] 1 unit INH DAILY 

09/02/24 


Topiramate [Topamax] 150 mg PO BID 09/02/24 











Objective





- Vital Signs/Intake & Output


Reviewed Vital Signs: Yes


Vital Signs: 


                                Vital Signs x48h











  Temp Pulse Pulse Resp BP Pulse Ox


 


 09/03/24 07:47  36.3 C L   85  20  143/98 H  97


 


 09/03/24 07:42   85   22  


 


 09/03/24 05:00  36.6 C   85  22  179/110 H  98











Intake & Output: 


                                 Intake & Output











 08/31/24 09/01/24 09/02/24 09/03/24





 23:59 23:59 23:59 23:59


 


Intake Total   3950.417 239.583


 


Output Total   1680 


 


Balance   2270.417 239.583














- Objective


General Appearance: positive: Lethargic, Other (She seems somewhat agitated)


Eyes Bilateral: positive: Other (The patient is unable to hold her eyes open)


ENT: positive: ENT inspection nml


Respiratory: positive: Other (She would not cooperate for an exam.  However her 

lungs sound clear anteriorly)


Cardiovascular: positive: Regular rate & rhythm, No murmur, No gallop.  

negative: Friction rub


Abdomen: positive: Non-tender, No organomegaly, Nml bowel sounds


Skin: positive: Color nml, No rash, Warm, Dry


Extremities: positive: Non-tender, Nml appearance


Neurologic/Psychiatric: positive: Disoriented to person, Disoriented to place, 

Disoriented to time, Slurred/abnml speech, Other (She is somewhat agitated)





- Lab Results


Fish Bones: 


                                 09/03/24 05:44





                                 09/03/24 05:44


Other Labs: 


                               Lab Results x24hrs











  09/03/24 09/03/24 09/02/24 Range/Units





  05:44 05:44 09:30 


 


WBC   3.9 L   (4.8-10.8)  x10^3/uL


 


RBC   3.46 L   (4.20-5.40)  10^6/uL


 


Hgb   12.8   (12.0-16.0)  g/dL


 


Hct   36.2 L   (37.0-47.0)  %


 


MCV   104.6 H   (81.0-99.0)  fL


 


MCH   37.0 H   (27.0-31.0)  pg


 


MCHC   35.4   (32.0-36.0)  g/dL


 


RDW   14.2   (12.0-15.0)  %


 


Plt Count   43 L   (130-450)  10^3/uL


 


MPV   9.6   (7.9-10.8)  fL


 


Neut # (Auto)   2.9   (1.5-6.6)  10^3/uL


 


Lymph # (Auto)   0.7 L   (1.5-3.5)  10^3/uL


 


Mono # (Auto)   0.3   (0.0-1.0)  10^3/uL


 


Eos # (Auto)   0.0   (0.0-0.7)  10^3/uL


 


Baso # (Auto)   0.0   (0.0-0.1)  10^3/uL


 


Absolute Nucleated RBC   0.00   x10^3/uL


 


Nucleated RBC %   0.0   /100WBC


 


Sodium  139    (135-145)  mmol/L


 


Potassium  3.8    (3.5-4.5)  mmol/L


 


Chloride  105    (101-111)  mmol/L


 


Carbon Dioxide  23    (21-32)  mmol/L


 


Anion Gap  11.0    (6-13)  


 


BUN  11    (6-20)  mg/dL


 


Creatinine  0.8    (0.6-1.3)  mg/dL


 


Estimated GFR (MDRD)  76 L    (>89)  


 


Glucose  97    ()  mg/dL


 


Estimat Average Glucose     ()  mg/dL


 


Hemoglobin A1c %     (4.27-6.07)  %


 


Calcium  9.4    (8.5-10.3)  mg/dL


 


Phosphorus  2.7    (2.5-5.0)  mg/dL


 


Magnesium  2.0    (1.7-2.3)  mg/dL


 


Urine Color    LIGHT YELLOW  


 


Urine Clarity    CLEAR  (CLEAR)  


 


Urine pH    6.5  (5.0-7.5)  PH


 


Ur Specific Gravity    <=1.005  (1.002-1.030)  


 


Urine Protein    NEGATIVE  (NEGATIVE)  mg/dL


 


Urine Glucose (UA)    NEGATIVE  (NEGATIVE)  mg/dL


 


Urine Ketones    NEGATIVE  (NEGATIVE)  mg/dL


 


Urine Occult Blood    NEGATIVE  (NEGATIVE)  


 


Urine Nitrite    NEGATIVE  (NEGATIVE)  


 


Urine Bilirubin    NEGATIVE  (NEGATIVE)  


 


Urine Urobilinogen    0.2 (NORMAL)  (NORMAL)  E.U./dL


 


Ur Leukocyte Esterase    NEGATIVE  (NEGATIVE)  


 


Ur Microscopic Review    NOT INDICATED  


 


Urine Culture Comments    NOT INDICATED  


 


Urine Opiates Screen    NEGATIVE  (NEGATIVE)  


 


Ur Buprenorphine Scrn    NEGATIVE  (NEGATIVE)  


 


Ur Oxycodone Screen    NEGATIVE  (NEGATIVE)  


 


Urine Methadone Screen    NEGATIVE  (NEGATIVE)  


 


Ur Barbiturates Screen    NEGATIVE  (NEGATIVE)  


 


Ur Tricyclics Screen    POSITIVE H  (NEGATIVE)  


 


Ur Phencyclidine Scrn    NEGATIVE  (NEGATIVE)  


 


Ur Amphetamine Screen    NEGATIVE  (NEGATIVE)  


 


U Methamphetamines Scrn    NEGATIVE  (NEGATIVE)  


 


U Benzodiazepines Scrn    NEGATIVE  (NEGATIVE)  


 


Urine Cocaine Screen    NEGATIVE  (NEGATIVE)  


 


U Cannabinoids Screen    NEGATIVE  (NEGATIVE)  


 


Ur Drug Screen Comment    CUTOFF CONC BELOW:  














  09/02/24 Range/Units





  05:38 


 


WBC   (4.8-10.8)  x10^3/uL


 


RBC   (4.20-5.40)  10^6/uL


 


Hgb   (12.0-16.0)  g/dL


 


Hct   (37.0-47.0)  %


 


MCV   (81.0-99.0)  fL


 


MCH   (27.0-31.0)  pg


 


MCHC   (32.0-36.0)  g/dL


 


RDW   (12.0-15.0)  %


 


Plt Count   (130-450)  10^3/uL


 


MPV   (7.9-10.8)  fL


 


Neut # (Auto)   (1.5-6.6)  10^3/uL


 


Lymph # (Auto)   (1.5-3.5)  10^3/uL


 


Mono # (Auto)   (0.0-1.0)  10^3/uL


 


Eos # (Auto)   (0.0-0.7)  10^3/uL


 


Baso # (Auto)   (0.0-0.1)  10^3/uL


 


Absolute Nucleated RBC   x10^3/uL


 


Nucleated RBC %   /100WBC


 


Sodium   (135-145)  mmol/L


 


Potassium   (3.5-4.5)  mmol/L


 


Chloride   (101-111)  mmol/L


 


Carbon Dioxide   (21-32)  mmol/L


 


Anion Gap   (6-13)  


 


BUN   (6-20)  mg/dL


 


Creatinine   (0.6-1.3)  mg/dL


 


Estimated GFR (MDRD)   (>89)  


 


Glucose   ()  mg/dL


 


Estimat Average Glucose  91  ()  mg/dL


 


Hemoglobin A1c %  4.8  (4.27-6.07)  %


 


Calcium   (8.5-10.3)  mg/dL


 


Phosphorus   (2.5-5.0)  mg/dL


 


Magnesium   (1.7-2.3)  mg/dL


 


Urine Color   


 


Urine Clarity   (CLEAR)  


 


Urine pH   (5.0-7.5)  PH


 


Ur Specific Gravity   (1.002-1.030)  


 


Urine Protein   (NEGATIVE)  mg/dL


 


Urine Glucose (UA)   (NEGATIVE)  mg/dL


 


Urine Ketones   (NEGATIVE)  mg/dL


 


Urine Occult Blood   (NEGATIVE)  


 


Urine Nitrite   (NEGATIVE)  


 


Urine Bilirubin   (NEGATIVE)  


 


Urine Urobilinogen   (NORMAL)  E.U./dL


 


Ur Leukocyte Esterase   (NEGATIVE)  


 


Ur Microscopic Review   


 


Urine Culture Comments   


 


Urine Opiates Screen   (NEGATIVE)  


 


Ur Buprenorphine Scrn   (NEGATIVE)  


 


Ur Oxycodone Screen   (NEGATIVE)  


 


Urine Methadone Screen   (NEGATIVE)  


 


Ur Barbiturates Screen   (NEGATIVE)  


 


Ur Tricyclics Screen   (NEGATIVE)  


 


Ur Phencyclidine Scrn   (NEGATIVE)  


 


Ur Amphetamine Screen   (NEGATIVE)  


 


U Methamphetamines Scrn   (NEGATIVE)  


 


U Benzodiazepines Scrn   (NEGATIVE)  


 


Urine Cocaine Screen   (NEGATIVE)  


 


U Cannabinoids Screen   (NEGATIVE)  


 


Ur Drug Screen Comment   














Assessment/Plan





- Problem List


(1) Drug overdose


Impression: 


The patient presented with a possible drug overdose.  Urine drug screen was 

positive for tricyclics.  The patient has been found an empty bottle of 

amitriptyline at home and she has not been on this for quite some time.  Poison 

control was notified and we are currently monitoring EKGs.  She is hemod

ynamically stable at this time.  Still quite confused.  Also at the time of 

admission her alcohol level was elevated at 250.  I am placing her on CIWA 

protocol today.  A meaningful review of systems could not be obtained from the 

patient.  There are no family members at the bedside.








(2) Suicide attempt


Impression: 


The patient has a history of a suicide attempt in the past.  She had recently 

told her  that she had suicidal ideation but she did not have a plan.  

Then she did not talk about it recently so he felt like she was okay.  There is 

an empty bottle of amitriptyline and for now this is a presumed suicide attempt.

  Will place her on one-to-one supervision and she likely will need psych 

evaluation and placement prior to going home.








(3) Alcohol withdrawal


Impression: 


The patient's alcohol level was elevated at 250 at the time of admission.  Will 

place her on CIWA protocol for now.








(4) Toxic encephalopathy


Impression: 


Patient is still acutely encephalopathic likely due to amitriptyline overdose 

and alcohol intoxication.  Supportive care at this point and she will be treated

 for alcohol withdrawal








(5) Severe major depression


Impression: 


The patient was on duloxetine 60 mg daily at home as well as trazodone 50 mg 

p.o. nightly and also took lorazepam 0.5 mg daily as needed.  For now I am 

holding all of these medications.








(6) Migraine


Impression: 


The patient takes Topamax at home.  This is currently been held








(7) History of seizure disorder


Impression: 


Continue to monitor closely.  She should be under seizure precautions








(8) Hypertensive urgency


Impression: 


She takes lisinopril 10 mg daily at home.  She cannot safely swallow pills at 

this point.  She has IV hydralazine available as needed








(9) Hyponatremia


Impression: 


Resolved with IV fluids








(10) Pancytopenia


Impression: 


Of undetermined significance at this point.  She has rather significant 

thrombocytopenia and now with pancytopenia.  This could be due to underlying 

liver dysfunction.  Will monitor a CBC daily and further workup can be performed

 after she recuperates from this acute illness.








(11) Hypokalemia


Impression: 


Repleted and resolved








Disposition: The patient's status will be changed from observation to a full 

admission.  The patient is still acutely encephalopathic.  She may be having 

alcohol withdrawal.  This was an apparent suicide attempt and she will need to 

be held on one-to-one supervision until she can have a psych evaluation and 

probable psych placement.





Time spent: 35 minutes

## 2024-09-04 LAB
ALBUMIN DIAFP-MCNC: 3.9 G/DL (ref 3.2–5.5)
ANION GAP SERPL CALCULATED.4IONS-SCNC: 9 MMOL/L (ref 6–13)
BASOPHILS NFR BLD AUTO: 0 10^3/UL (ref 0–0.1)
BASOPHILS NFR BLD AUTO: 0.2 %
BUN SERPL-MCNC: 11 MG/DL (ref 6–20)
CALCIUM UR-MCNC: 9.1 MG/DL (ref 8.5–10.3)
CHLORIDE SERPL-SCNC: 107 MMOL/L (ref 101–111)
CO2 SERPL-SCNC: 21 MMOL/L (ref 21–32)
CREAT SERPLBLD-SCNC: 0.8 MG/DL (ref 0.6–1.3)
EOSINOPHIL # BLD AUTO: 0 10^3/UL (ref 0–0.7)
EOSINOPHIL NFR BLD AUTO: 0 %
ERYTHROCYTE [DISTWIDTH] IN BLOOD BY AUTOMATED COUNT: 14.4 % (ref 12–15)
GFRSERPLBLD MDRD-ARVRAT: 76 ML/MIN/{1.73_M2} (ref 89–?)
GLUCOSE SERPL-MCNC: 90 MG/DL (ref 74–104)
HCT VFR BLD AUTO: 36.7 % (ref 37–47)
HGB UR QL STRIP: 12.4 G/DL (ref 12–16)
LYMPHOCYTES # SPEC AUTO: 1.1 10^3/UL (ref 1.5–3.5)
LYMPHOCYTES NFR BLD AUTO: 23.6 %
MAGNESIUM SERPL-MCNC: 1.8 MG/DL (ref 1.7–2.3)
MCH RBC QN AUTO: 35.9 PG (ref 27–31)
MCHC RBC AUTO-ENTMCNC: 33.8 G/DL (ref 32–36)
MCV RBC AUTO: 106.4 FL (ref 81–99)
MONOCYTES # BLD AUTO: 0.4 10^3/UL (ref 0–1)
MONOCYTES NFR BLD AUTO: 9.5 %
NEUTROPHILS # BLD AUTO: 3 10^3/UL (ref 1.5–6.6)
NEUTROPHILS # SNV AUTO: 4.5 X10^3/UL (ref 4.8–10.8)
NEUTROPHILS NFR BLD AUTO: 66.5 %
NRBC # BLD AUTO: 0 /100WBC
NRBC # BLD AUTO: 0 X10^3/UL
PDW BLD AUTO: 10 FL (ref 7.9–10.8)
PHOSPHATE BLD-MCNC: 2.6 MG/DL (ref 2.5–5)
PLATELET # BLD: 43 10^3/UL (ref 130–450)
POTASSIUM SERPL-SCNC: 3.6 MMOL/L (ref 3.5–4.5)
RBC MAR: 3.45 10^6/UL (ref 4.2–5.4)
SODIUM SERPLBLD-SCNC: 137 MMOL/L (ref 135–145)

## 2024-09-04 RX ADMIN — SODIUM CHLORIDE PRN MLS/HR: 9 INJECTION, SOLUTION INTRAVENOUS at 00:00

## 2024-09-04 RX ADMIN — SODIUM CHLORIDE SCH MLS/HR: 9 INJECTION, SOLUTION INTRAVENOUS at 12:40

## 2024-09-04 NOTE — PROVIDER PROGRESS NOTE
Subjective





- Prog Note Date


Prog Note Date: 09/04/24


Prog Note Time: 11:10





- Subjective


Subjective: 





The patient is a little more alert today. She is still lethargic but is able to 

wake up a bit. She was able to confirm she did intentionally take medication. 

She could not confirm she took Amitriptyline.  She said she cannot remember but 

did indicate that she took Zoloft.  She was unable to give me a good review of 

systems.She did tell me she had some epigastric abdominal pain yesterday but 

that was about as much as I could get out of her.





Current Medications





- Current Medications


Current Medications: 





Active Medications











Generic Name Dose Route Start Last Admin





  Trade Name Freq  PRN Reason Stop Dose Admin


 


Acetaminophen  650 mg  09/02/24 04:45 





  Acetaminophen 325 Mg Tablet  PO  





  Q6H PRN  





  Pain 1 to 4, or Fever  


 


Albuterol/Ipratropium  3 ml  09/03/24 15:00  09/04/24 09:09





  Ipratropium/Albuterol 3 Ml Neb  INH   Not Given





  RTQID JOHN  


 


Budesonide  0.5 mg  09/02/24 07:00  09/04/24 09:09





  Budesonide 0.5 Mg/2 Ml Neb  INH   Not Given





  RTBID JOHN  


 


Famotidine  20 mg  09/02/24 09:00  09/04/24 08:35





  Famotidine 20 Mg/2 Ml Vial  IVP   20 mg





  BID JOHN   Administration


 


Hydralazine HCl  10 mg  09/03/24 07:44 





  Hydralazine Inj 20 Mg/Ml Vial  IVP  





  Q6H PRN  





  Hypertensive Emergency  


 


Acetaminophen  1,000 mg in 100 mls @ 400 mls/hr  09/02/24 19:06 





  Acetaminophen  IV  





  Q6HR PRN  





  Moderate Pain (Level 4-6)  


 


Multivitamins 10 ml/ Thiamine  1,011.2 mls @ 100 mls/hr  09/03/24 12:00  

09/03/24 23:00





HCl 100 mg/ Folic Acid 1 mg/  IV   Infused





Sodium Chloride  DAILY@1200 JOHN   Infusion


 


Sodium Chloride  500 mls @ 20 mls/hr  09/03/24 23:25  09/04/24 00:00





  Normal Saline 0.9%  IV   20 mls/hr





  Q24H PRN   Administration





  TKO RATE  


 


Lisinopril  10 mg  09/02/24 09:00  09/04/24 08:35





  Lisinopril 5 Mg Tablet  PO   10 mg





  DAILY JOHN   Administration


 


Lorazepam  1 mg  09/03/24 10:07  09/03/24 23:41





  Lorazepam 2 Mg/Ml Vial  IVP   1 mg





  Q30M PRN   Administration





  CIWA >8  





  Protocol  


 


Lorazepam  1 mg  09/03/24 17:48 





  Lorazepam 2 Mg/Ml Vial  IVP  





  Q2H PRN  





  Anxiety  


 


Ondansetron HCl  4 mg  09/02/24 04:45 





  Ondansetron Odt 4 Mg Tablet  TL  





  Q6HR PRN  





  Nausea / Vomiting  


 


Sodium Chloride  10 ml  09/02/24 09:00  09/04/24 08:35





  Sodium Chloride Flush 0.9% 10 Ml Syringe  IVP   10 ml





  0100,0900,1700 JOHN   Administration


 


Sodium Chloride  10 ml  09/02/24 04:45  09/02/24 22:24





  Sodium Chloride Flush 0.9% 10 Ml Syringe  IVP   10 ml





  PRN PRN   Administration





  AS NEEDED PER PROVIDER ORDERS  


 


Topiramate  150 mg  09/03/24 21:00  09/04/24 08:35





  Topiramate 100 Mg Tablet  PO   150 mg





  BID JOHN   Administration








                                        





LORazepam [Ativan] 0.5 mg PO PRN PRN 04/08/22 


DULoxetine [Cymbalta] 60 mg PO DAILY 10/11/23 


Montelukast [Singulair] 10 mg PO QPM 10/11/23 


traZODone [Desyrel] 50 mg PO HS 10/11/23 


Benralizumab [Fasenra Pen] 30 mg SUBQ Q56D 09/02/24 


Cetirizine [ZyrTEC] 10 mg PO BID 09/02/24 


Fluticasone/Umeclidin/Vilanter [Trelegy Ellipta 200-62.5-25] 1 unit INH DAILY 

09/02/24 


Topiramate [Topamax] 150 mg PO BID 09/02/24 











Objective





- Vital Signs/Intake & Output


Reviewed Vital Signs: Yes


Vital Signs: 


                                Vital Signs x48h











  Temp Pulse Resp BP Pulse Ox


 


 09/04/24 08:12  36.4 C L  98  18  145/95 H  96


 


 09/04/24 05:00  36.4 C L  84  16  158/93 H  97











Intake & Output: 


                                 Intake & Output











 09/01/24 09/02/24 09/03/24 09/04/24





 23:59 23:59 23:59 23:59


 


Intake Total  5160.417 2801.783 120


 


Output Total  6744 197 9577


 


Balance  2270.417 2551.783 -1080














- Objective


General Appearance: positive: No acute distress


Eyes Bilateral: positive: Normal inspection


ENT: positive: ENT inspection nml


Neck: positive: Nml inspection


Respiratory: positive: Chest non-tender, No respiratory distress, Other (She 

will not cooperate for an exam)


Cardiovascular: positive: Regular rate & rhythm, No murmur, No gallop.  

negative: Friction rub


Abdomen: positive: Non-tender, No organomegaly, Nml bowel sounds


Skin: positive: Color nml, No rash, Warm


Extremities: positive: Non-tender, Full ROM


Neurologic/Psychiatric: positive: Other (She knew her name and that she was in 

the hospital.  She also was able to confirm she took medications intentionally. 

 Still quite lethargic with some confusion.)





- Lab Results


Fish Bones: 


                                 09/04/24 05:23





                                 09/04/24 05:23


Other Labs: 


                               Lab Results x24hrs











  09/04/24 09/04/24 09/03/24 Range/Units





  05:23 05:23 18:38 


 


WBC   4.5 L   (4.8-10.8)  x10^3/uL


 


RBC   3.45 L   (4.20-5.40)  10^6/uL


 


Hgb   12.4   (12.0-16.0)  g/dL


 


Hct   36.7 L   (37.0-47.0)  %


 


MCV   106.4 H   (81.0-99.0)  fL


 


MCH   35.9 H   (27.0-31.0)  pg


 


MCHC   33.8   (32.0-36.0)  g/dL


 


RDW   14.4   (12.0-15.0)  %


 


Plt Count   43 L   (130-450)  10^3/uL


 


MPV   10.0   (7.9-10.8)  fL


 


Neut # (Auto)   3.0   (1.5-6.6)  10^3/uL


 


Lymph # (Auto)   1.1 L   (1.5-3.5)  10^3/uL


 


Mono # (Auto)   0.4   (0.0-1.0)  10^3/uL


 


Eos # (Auto)   0.0   (0.0-0.7)  10^3/uL


 


Baso # (Auto)   0.0   (0.0-0.1)  10^3/uL


 


Absolute Nucleated RBC   0.00   x10^3/uL


 


Nucleated RBC %   0.0   /100WBC


 


PT     (9.9-12.6)  secs


 


INR     (0.8-1.2)  


 


Sodium  137   138  (135-145)  mmol/L


 


Potassium  3.6   4.3  (3.5-4.5)  mmol/L


 


Chloride  107   106  (101-111)  mmol/L


 


Carbon Dioxide  21   23  (21-32)  mmol/L


 


Anion Gap  9.0   9.0  (6-13)  


 


BUN  11   10  (6-20)  mg/dL


 


Creatinine  0.8   0.8  (0.6-1.3)  mg/dL


 


Estimated GFR (MDRD)  76 L   76 L  (>89)  


 


Glucose  90   110 H  ()  mg/dL


 


Calcium  9.1   9.6  (8.5-10.3)  mg/dL


 


Phosphorus  2.6    (2.5-5.0)  mg/dL


 


Magnesium  1.8    (1.7-2.3)  mg/dL


 


Total Bilirubin    1.4 H  (0.2-1.0)  mg/dL


 


AST    32  (10-42)  IU/L


 


ALT    32  (10-60)  IU/L


 


Alkaline Phosphatase    126 H  ()  IU/L


 


Total Protein    7.4  (6.4-8.9)  g/dL


 


Albumin  3.9   4.2  (3.2-5.5)  g/dL


 


Globulin    3.2  (2.1-4.2)  g/dL


 


Albumin/Globulin Ratio    1.3  (1.0-2.2)  


 


Urine Color     


 


Urine Clarity     (CLEAR)  


 


Urine pH     (5.0-7.5)  PH


 


Ur Specific Gravity     (1.002-1.030)  


 


Urine Protein     (NEGATIVE)  mg/dL


 


Urine Glucose (UA)     (NEGATIVE)  mg/dL


 


Urine Ketones     (NEGATIVE)  mg/dL


 


Urine Occult Blood     (NEGATIVE)  


 


Urine Nitrite     (NEGATIVE)  


 


Urine Bilirubin     (NEGATIVE)  


 


Urine Urobilinogen     (NORMAL)  E.U./dL


 


Ur Leukocyte Esterase     (NEGATIVE)  


 


Ur Microscopic Review     


 


Urine Culture Comments     














  09/03/24 09/03/24 09/03/24 Range/Units





  18:38 18:38 13:15 


 


WBC   4.7 L   (4.8-10.8)  x10^3/uL


 


RBC   3.54 L   (4.20-5.40)  10^6/uL


 


Hgb   13.0   (12.0-16.0)  g/dL


 


Hct   37.3   (37.0-47.0)  %


 


MCV   105.4 H   (81.0-99.0)  fL


 


MCH   36.7 H   (27.0-31.0)  pg


 


MCHC   34.9   (32.0-36.0)  g/dL


 


RDW   14.3   (12.0-15.0)  %


 


Plt Count   38 L   (130-450)  10^3/uL


 


MPV   9.0   (7.9-10.8)  fL


 


Neut # (Auto)   3.4   (1.5-6.6)  10^3/uL


 


Lymph # (Auto)   0.9 L   (1.5-3.5)  10^3/uL


 


Mono # (Auto)   0.4   (0.0-1.0)  10^3/uL


 


Eos # (Auto)   0.0   (0.0-0.7)  10^3/uL


 


Baso # (Auto)   0.0   (0.0-0.1)  10^3/uL


 


Absolute Nucleated RBC   0.00   x10^3/uL


 


Nucleated RBC %   0.0   /100WBC


 


PT  14.2 H    (9.9-12.6)  secs


 


INR  1.3 H    (0.8-1.2)  


 


Sodium     (135-145)  mmol/L


 


Potassium     (3.5-4.5)  mmol/L


 


Chloride     (101-111)  mmol/L


 


Carbon Dioxide     (21-32)  mmol/L


 


Anion Gap     (6-13)  


 


BUN     (6-20)  mg/dL


 


Creatinine     (0.6-1.3)  mg/dL


 


Estimated GFR (MDRD)     (>89)  


 


Glucose     ()  mg/dL


 


Calcium     (8.5-10.3)  mg/dL


 


Phosphorus     (2.5-5.0)  mg/dL


 


Magnesium     (1.7-2.3)  mg/dL


 


Total Bilirubin     (0.2-1.0)  mg/dL


 


AST     (10-42)  IU/L


 


ALT     (10-60)  IU/L


 


Alkaline Phosphatase     ()  IU/L


 


Total Protein     (6.4-8.9)  g/dL


 


Albumin     (3.2-5.5)  g/dL


 


Globulin     (2.1-4.2)  g/dL


 


Albumin/Globulin Ratio     (1.0-2.2)  


 


Urine Color    LIGHT YELLOW  


 


Urine Clarity    CLEAR  (CLEAR)  


 


Urine pH    7.5  (5.0-7.5)  PH


 


Ur Specific Gravity    1.015  (1.002-1.030)  


 


Urine Protein    NEGATIVE  (NEGATIVE)  mg/dL


 


Urine Glucose (UA)    NEGATIVE  (NEGATIVE)  mg/dL


 


Urine Ketones    NEGATIVE  (NEGATIVE)  mg/dL


 


Urine Occult Blood    NEGATIVE  (NEGATIVE)  


 


Urine Nitrite    NEGATIVE  (NEGATIVE)  


 


Urine Bilirubin    NEGATIVE  (NEGATIVE)  


 


Urine Urobilinogen    0.2 (NORMAL)  (NORMAL)  E.U./dL


 


Ur Leukocyte Esterase    NEGATIVE  (NEGATIVE)  


 


Ur Microscopic Review    NOT INDICATED  


 


Urine Culture Comments    NOT INDICATED  














Assessment/Plan





- Problem List


(1) Drug overdose


Impression: 


The patient was able to confirm that this was an intentional drug overdose.  She

 could not recall taking amitriptyline but did say that she took Zoloft.  She 

did test positive for tricyclics and her  did find an empty bottle of 

amitriptyline so it seems likely that she took this as well.  Supportive care 

for now.  Serial EKGs were performed yesterday with no significant changes.  

Will obtain 1 more EKG this morning and then stop.








(2) Suicide attempt


Impression: 


Once she wakes up and is more interactive we will get an telepsych consult and 

she will be referred for inpatient psychiatric care for intentional drug 

overdose








(3) Alcohol withdrawal


Impression: 


Continue CIWA protocol.








(4) Toxic encephalopathy


Impression: 


Due to intentional drug overdose and possible alcohol withdrawal.  Slowly 

improving








(5) Severe major depression


Impression: 


She will need inpatient psychiatric evaluation and treatment








(6) Migraine


Impression: 


Continue Topamax








(7) History of seizure disorder


Impression: 


She has had no seizure activity during this hospitalization.  She takes Topamax 

to control her seizures.








(8) Hypertensive urgency


Impression: 


Improved.  Continue lisinopril








(9) Hyponatremia


Impression: 


Resolved








(10) Pancytopenia


Impression: 


Of undetermined significance at this point.  Possibly due to liver dysfunction. 

 Platelet level is quite low.  Further workup can be obtained as an outpatient. 

 For now her blood counts are stable








(11) Hypokalemia


Impression: 


Repleted and resolved





Disposition: Inpatient hospitalization remains necessary.  The patient is still 

quite groggy and confused from an intentional drug overdose.  She needs to be 

stabilized and then we will pursue inpatient psychiatric placement





Time spent: 35 minutes

## 2024-09-05 LAB
ALBUMIN DIAFP-MCNC: 3.8 G/DL (ref 3.2–5.5)
ANION GAP SERPL CALCULATED.4IONS-SCNC: 7 MMOL/L (ref 6–13)
BASOPHILS NFR BLD AUTO: 0 %
BASOPHILS NFR BLD AUTO: 0 10^3/UL (ref 0–0.1)
BUN SERPL-MCNC: 13 MG/DL (ref 6–20)
CALCIUM UR-MCNC: 9.1 MG/DL (ref 8.5–10.3)
CHLORIDE SERPL-SCNC: 109 MMOL/L (ref 101–111)
CO2 SERPL-SCNC: 22 MMOL/L (ref 21–32)
CREAT SERPLBLD-SCNC: 0.9 MG/DL (ref 0.6–1.3)
EOSINOPHIL # BLD AUTO: 0 10^3/UL (ref 0–0.7)
EOSINOPHIL NFR BLD AUTO: 0 %
ERYTHROCYTE [DISTWIDTH] IN BLOOD BY AUTOMATED COUNT: 14.6 % (ref 12–15)
GFRSERPLBLD MDRD-ARVRAT: 66 ML/MIN/{1.73_M2} (ref 89–?)
GLUCOSE SERPL-MCNC: 126 MG/DL (ref 74–104)
HCT VFR BLD AUTO: 36.5 % (ref 37–47)
HGB UR QL STRIP: 12.3 G/DL (ref 12–16)
LYMPHOCYTES # SPEC AUTO: 1.1 10^3/UL (ref 1.5–3.5)
LYMPHOCYTES NFR BLD AUTO: 18.2 %
MAGNESIUM SERPL-MCNC: 1.6 MG/DL (ref 1.7–2.3)
MCH RBC QN AUTO: 35.8 PG (ref 27–31)
MCHC RBC AUTO-ENTMCNC: 33.7 G/DL (ref 32–36)
MCV RBC AUTO: 106.1 FL (ref 81–99)
MONOCYTES # BLD AUTO: 0.5 10^3/UL (ref 0–1)
MONOCYTES NFR BLD AUTO: 8.1 %
NEUTROPHILS # BLD AUTO: 4.4 10^3/UL (ref 1.5–6.6)
NEUTROPHILS # SNV AUTO: 6 X10^3/UL (ref 4.8–10.8)
NEUTROPHILS NFR BLD AUTO: 73.4 %
NRBC # BLD AUTO: 0 /100WBC
NRBC # BLD AUTO: 0 X10^3/UL
PDW BLD AUTO: 9.7 FL (ref 7.9–10.8)
PHOSPHATE BLD-MCNC: 2.8 MG/DL (ref 2.5–5)
PLATELET # BLD: 57 10^3/UL (ref 130–450)
POTASSIUM SERPL-SCNC: 3.5 MMOL/L (ref 3.5–4.5)
RBC MAR: 3.44 10^6/UL (ref 4.2–5.4)
SODIUM SERPLBLD-SCNC: 138 MMOL/L (ref 135–145)

## 2024-09-05 RX ADMIN — Medication SCH TAB: at 07:50

## 2024-09-05 RX ADMIN — Medication SCH MG: at 10:18

## 2024-09-05 RX ADMIN — FAMOTIDINE SCH MG: 20 TABLET, FILM COATED ORAL at 20:29

## 2024-09-05 RX ADMIN — MAGNESIUM SULFATE HEPTAHYDRATE ONE MLS/HR: 2 INJECTION, SOLUTION INTRAVENOUS at 07:50

## 2024-09-05 NOTE — PROVIDER PROGRESS NOTE
Subjective





- Prog Note Date


Prog Note Date: 09/05/24


Prog Note Time: 09:05





- Subjective


Pt reports feeling: Improved


Subjective: 


The patient is much improved today.  She is awake and alert and sitting up in 

bed eating her breakfast.  She answers questions appropriately.  She does 

confirm that she took pills.  She thinks it may have been the amitriptyline and 

possibly along with some Zoloft.  She became quite tearful when discussing her 

situation.  She said that she has been quite inactive due to back pain.  She is 

waiting to get an injection in her lower spine and hips.  She said this keeps 

her from doing things around the house.  She says that her house was under 

renovation and the walls have been taken down to the studies.  She also says 

that her  has not been able to get organized enough to finish the job.  

Also he has not finished a job in the yard.  And she feels quite hopeless that 

it is ever going to get done.  She says that she is depressed and has been for 

quite some time.





In regards to her alcohol use she says that she does drink every day.  She says 

she pours 2 ounce shots of vodka and drinks at least 6 or 7 of these a day.  She

has never had issues with alcohol withdrawal.  She does not exactly recall what 

happened prior to her taking the pills.  She thinks that she had been drinking 

and that her and her  got in an argument possibly due to to how much she 

had drank.  She does not recall taking all of the pills but does recall being 

extremely hopeless





We did discuss that she will need inpatient psychiatric evaluation.  She is 

medically cleared as of today and I will let the social workers know so that 

they can work on placement.





This morning she denies fever or shaking chills.  No chest pain or heart 

palpitations.  No nausea vomiting or diarrhea.  No urinary complaints.  She says

she just feels weak this morning and extremely depressed and hopeless.




















Current Medications





- Current Medications


Current Medications: 





Active Medications











Generic Name Dose Route Start Last Admin





  Trade Name Freq  PRN Reason Stop Dose Admin


 


Acetaminophen  650 mg  09/02/24 04:45 





  Acetaminophen 325 Mg Tablet  PO  





  Q6H PRN  





  Pain 1 to 4, or Fever  


 


Albuterol/Ipratropium  3 ml  09/03/24 15:00  09/05/24 07:02





  Ipratropium/Albuterol 3 Ml Neb  INH   3 ml





  RTQID JOHN   Administration


 


Budesonide  0.5 mg  09/02/24 07:00  09/05/24 07:01





  Budesonide 0.5 Mg/2 Ml Neb  INH   0.5 mg





  RTBID JOHN   Administration


 


Famotidine  20 mg  09/02/24 09:00  09/05/24 07:50





  Famotidine 20 Mg/2 Ml Vial  IVP   20 mg





  BID JOHN   Administration


 


Hydralazine HCl  10 mg  09/03/24 07:44 





  Hydralazine Inj 20 Mg/Ml Vial  IVP  





  Q6H PRN  





  Hypertensive Emergency  


 


Acetaminophen  1,000 mg in 100 mls @ 400 mls/hr  09/02/24 19:06 





  Acetaminophen  IV  





  Q6HR PRN  





  Moderate Pain (Level 4-6)  


 


Sodium Chloride  500 mls @ 20 mls/hr  09/03/24 23:25  09/04/24 00:00





  Normal Saline 0.9%  IV   20 mls/hr





  Q24H PRN   Administration





  TKO RATE  


 


Lisinopril  10 mg  09/02/24 09:00  09/05/24 07:51





  Lisinopril 5 Mg Tablet  PO   10 mg





  DAILY JOHN   Administration


 


Lorazepam  1 mg  09/03/24 10:07  09/03/24 23:41





  Lorazepam 2 Mg/Ml Vial  IVP   1 mg





  Q30M PRN   Administration





  CIWA >8  





  Protocol  


 


Lorazepam  1 mg  09/03/24 17:48 





  Lorazepam 2 Mg/Ml Vial  IVP  





  Q2H PRN  





  Anxiety  


 


Ondansetron HCl  4 mg  09/02/24 04:45 





  Ondansetron Odt 4 Mg Tablet  TL  





  Q6HR PRN  





  Nausea / Vomiting  


 


Polyethylene Glycol  17 gm  09/05/24 09:00 





  Polyethylene Glycol 3350 17 Gm Packet  PO  





  DAILY JOHN  


 


Prenatal Multivit/Folic Acid/Iron  1 tab  09/05/24 08:00  09/05/24 07:50





  Prenatal Vitamin Tablet  PO   1 tab





  DAILYWM JOHN   Administration


 


Sodium Chloride  10 ml  09/02/24 09:00  09/05/24 07:51





  Sodium Chloride Flush 0.9% 10 Ml Syringe  IVP   10 ml





  0100,0900,1700 JOHN   Administration


 


Sodium Chloride  10 ml  09/02/24 04:45  09/02/24 22:24





  Sodium Chloride Flush 0.9% 10 Ml Syringe  IVP   10 ml





  PRN PRN   Administration





  AS NEEDED PER PROVIDER ORDERS  


 


Thiamine HCl  100 mg  09/05/24 09:00 





  Thiamine 100 Mg Tablet  PO  





  DAILY JOHN  


 


Topiramate  150 mg  09/03/24 21:00  09/05/24 07:50





  Topiramate 100 Mg Tablet  PO   150 mg





  BID JOHN   Administration








                                        





LORazepam [Ativan] 0.5 mg PO PRN PRN 04/08/22 


DULoxetine [Cymbalta] 60 mg PO DAILY 10/11/23 


Montelukast [Singulair] 10 mg PO QPM 10/11/23 


traZODone [Desyrel] 50 mg PO HS 10/11/23 


Benralizumab [Fasenra Pen] 30 mg SUBQ Q56D 09/02/24 


Cetirizine [ZyrTEC] 10 mg PO BID 09/02/24 


Fluticasone/Umeclidin/Vilanter [Trelegy Ellipta 200-62.5-25] 1 unit INH DAILY 

09/02/24 


Topiramate [Topamax] 150 mg PO BID 09/02/24 











Objective





- Vital Signs/Intake & Output


Reviewed Vital Signs: Yes


Vital Signs: 


                                Vital Signs x48h











  Temp Pulse Pulse Resp BP Pulse Ox


 


 09/05/24 08:40  36.4 C L   99  16  143/79 H  94


 


 09/05/24 07:37  36.6 C   105 H  24  130/73  93


 


 09/05/24 07:03   84   20  


 


 09/05/24 05:00  36.7 C   85  20  134/81 H  97











Intake & Output: 


                                 Intake & Output











 09/02/24 09/03/24 09/04/24 09/05/24





 23:59 23:59 23:59 23:59


 


Intake Total 3950.417 2801.783 1240 447


 


Output Total 4127 481 3218 


 


Balance 2270.417 2551.783 -1660 447














- Objective


General Appearance: positive: No acute distress, Anxious, Other (She is tearful 

and depressed)


Eyes Bilateral: positive: Normal inspection


ENT: positive: ENT inspection nml, Other (She has a small wound that is scabbed 

over above her upper lip)


Neck: positive: Nml inspection


Respiratory: positive: Chest non-tender, No respiratory distress, Breath sounds 

nml


Cardiovascular: positive: Regular rate & rhythm, No murmur, No gallop.  

negative: Friction rub


Abdomen: positive: Non-tender, Nml bowel sounds


Skin: positive: Color nml, No rash, Warm, Dry


Extremities: positive: Non-tender


Neurologic/Psychiatric: positive: Oriented x3, CN's nml (2-12)





- Lab Results


Fish Bones: 


                                 09/05/24 05:08





                                 09/05/24 05:08


Other Labs: 


                               Lab Results x24hrs











  09/05/24 09/05/24 Range/Units





  05:08 05:08 


 


WBC   6.0  (4.8-10.8)  x10^3/uL


 


RBC   3.44 L  (4.20-5.40)  10^6/uL


 


Hgb   12.3  (12.0-16.0)  g/dL


 


Hct   36.5 L  (37.0-47.0)  %


 


MCV   106.1 H  (81.0-99.0)  fL


 


MCH   35.8 H  (27.0-31.0)  pg


 


MCHC   33.7  (32.0-36.0)  g/dL


 


RDW   14.6  (12.0-15.0)  %


 


Plt Count   57 L  (130-450)  10^3/uL


 


MPV   9.7  (7.9-10.8)  fL


 


Neut # (Auto)   4.4  (1.5-6.6)  10^3/uL


 


Lymph # (Auto)   1.1 L  (1.5-3.5)  10^3/uL


 


Mono # (Auto)   0.5  (0.0-1.0)  10^3/uL


 


Eos # (Auto)   0.0  (0.0-0.7)  10^3/uL


 


Baso # (Auto)   0.0  (0.0-0.1)  10^3/uL


 


Absolute Nucleated RBC   0.00  x10^3/uL


 


Nucleated RBC %   0.0  /100WBC


 


Sodium  138   (135-145)  mmol/L


 


Potassium  3.5   (3.5-4.5)  mmol/L


 


Chloride  109   (101-111)  mmol/L


 


Carbon Dioxide  22   (21-32)  mmol/L


 


Anion Gap  7.0   (6-13)  


 


BUN  13   (6-20)  mg/dL


 


Creatinine  0.9   (0.6-1.3)  mg/dL


 


Estimated GFR (MDRD)  66 L   (>89)  


 


Glucose  126 H   ()  mg/dL


 


Calcium  9.1   (8.5-10.3)  mg/dL


 


Phosphorus  2.8   (2.5-5.0)  mg/dL


 


Magnesium  1.6 L   (1.7-2.3)  mg/dL


 


Albumin  3.8   (3.2-5.5)  g/dL














ABX Reporting


Has patient been on IV antibiotics over the past 48 hours?: No





Sepsis Event Note (H)





- Evaluation


Current Stage of Sepsis: Ruled out





Assessment/Plan





- Problem List


(1) Drug overdose


Impression: 


The patient confirms that this was likely an intentional drug overdose taken 

while she was intoxicated.  She continues to be quite hopeless and depressed and

 would benefit from inpatient psychiatric evaluation and treatment.








(2) Suicide attempt


Impression: 


Plan as above








(3) Alcohol withdrawal


Impression: 


Improving.  Continue CIWA protocol








(4) Toxic encephalopathy


Impression: 


Secondary to intentional drug overdose and alcohol intoxication.  Resolving








(5) Severe major depression


Impression: 


She will need inpatient psychiatric evaluation and treatment








(6) Migraine


Impression: 


Continue Topamax








(7) History of seizure disorder


Impression: 


Continue Topamax








(8) Hypertensive urgency


Impression: 


Improved.  Continue lisinopril 10 mg daily








(9) Hyponatremia


Impression: 


Resolved








(10) Pancytopenia


Impression: 


The patient states that she follows with hematology as an outpatient and carries

 a diagnosis of ITP.  Continue to monitor closely.








(11) Hypokalemia


Impression: 


Repleted and resolved








Disposition: Inpatient hospitalization remains necessary for inpatient psych p

lacement.  The patient is medically cleared as of today and is stable for 

transfer to an inpatient psychiatric facility as soon as a bed is found.





Time spent: 35 minutes

## 2024-09-06 VITALS — DIASTOLIC BLOOD PRESSURE: 83 MMHG | OXYGEN SATURATION: 97 % | SYSTOLIC BLOOD PRESSURE: 157 MMHG

## 2024-09-06 LAB
ALBUMIN DIAFP-MCNC: 3.7 G/DL (ref 3.2–5.5)
ANION GAP SERPL CALCULATED.4IONS-SCNC: 9 MMOL/L (ref 6–13)
BASOPHILS NFR BLD AUTO: 0 %
BASOPHILS NFR BLD AUTO: 0 10^3/UL (ref 0–0.1)
BUN SERPL-MCNC: 10 MG/DL (ref 6–20)
CALCIUM UR-MCNC: 9.1 MG/DL (ref 8.5–10.3)
CHLORIDE SERPL-SCNC: 108 MMOL/L (ref 101–111)
CO2 SERPL-SCNC: 22 MMOL/L (ref 21–32)
CREAT SERPLBLD-SCNC: 0.8 MG/DL (ref 0.6–1.3)
EOSINOPHIL # BLD AUTO: 0 10^3/UL (ref 0–0.7)
EOSINOPHIL NFR BLD AUTO: 0 %
ERYTHROCYTE [DISTWIDTH] IN BLOOD BY AUTOMATED COUNT: 14.3 % (ref 12–15)
GFRSERPLBLD MDRD-ARVRAT: 76 ML/MIN/{1.73_M2} (ref 89–?)
GLUCOSE SERPL-MCNC: 121 MG/DL (ref 74–104)
HCT VFR BLD AUTO: 35.7 % (ref 37–47)
HGB UR QL STRIP: 11.9 G/DL (ref 12–16)
LYMPHOCYTES # SPEC AUTO: 1.1 10^3/UL (ref 1.5–3.5)
LYMPHOCYTES NFR BLD AUTO: 21 %
MAGNESIUM SERPL-MCNC: 1.6 MG/DL (ref 1.7–2.3)
MCH RBC QN AUTO: 36 PG (ref 27–31)
MCHC RBC AUTO-ENTMCNC: 33.3 G/DL (ref 32–36)
MCV RBC AUTO: 107.9 FL (ref 81–99)
MONOCYTES # BLD AUTO: 0.6 10^3/UL (ref 0–1)
MONOCYTES NFR BLD AUTO: 11.7 %
NEUTROPHILS # BLD AUTO: 3.6 10^3/UL (ref 1.5–6.6)
NEUTROPHILS # SNV AUTO: 5.4 X10^3/UL (ref 4.8–10.8)
NEUTROPHILS NFR BLD AUTO: 67.1 %
NRBC # BLD AUTO: 0 /100WBC
NRBC # BLD AUTO: 0 X10^3/UL
PDW BLD AUTO: 10 FL (ref 7.9–10.8)
PHOSPHATE BLD-MCNC: 4.8 MG/DL (ref 2.5–5)
PLATELET # BLD: 57 10^3/UL (ref 130–450)
POTASSIUM SERPL-SCNC: 3.4 MMOL/L (ref 3.5–4.5)
RBC MAR: 3.31 10^6/UL (ref 4.2–5.4)
SODIUM SERPLBLD-SCNC: 139 MMOL/L (ref 135–145)

## 2024-09-06 RX ADMIN — MAGNESIUM SULFATE HEPTAHYDRATE ONE MLS/HR: 2 INJECTION, SOLUTION INTRAVENOUS at 09:29

## 2024-09-06 RX ADMIN — DOCUSATE SODIUM SCH MG: 250 CAPSULE, LIQUID FILLED ORAL at 09:28

## 2024-09-06 RX ADMIN — SENNOSIDES SCH MG: 8.6 TABLET, FILM COATED ORAL at 09:28

## 2024-09-06 RX ADMIN — POTASSIUM CHLORIDE ONE MEQ: 1500 TABLET, EXTENDED RELEASE ORAL at 07:44

## 2024-09-06 NOTE — DISCHARGE SUMMARY
Discharge Summary


Admit Date: 09/02/24


Discharge Date: 09/06/24


Discharging Provider: Paula Hairston PA-C


Primary Care Provider: Dr Joseph Brandon


Code Status: Attempt Resuscitation


Condition at Discharge: Fair


Discharge Disposition: 01 Home, Self Care





- DIAGNOSES


Discharge Diagnoses with Status of Each Condition: 


1.  Drug overdose


This was an intentional drug overdose however it was taken while she was 

intoxicated and upset.  She did test positive for tricyclics and likely took 

amitriptyline as her  found an empty bottle at home.  She was monitored 

closely with serial EKGs and did not develop any significant issues.





2.  Possible suicide attempt


Again this was intentional but done while intoxicated.  She has no suicidal 

ideation at this time.  Social workers have set up appropriate follow-up.





3.  Alcohol withdrawal


She received CIWA protocol during this hospitalization.  She has no further 

evidence of alcohol withdrawal on the day of discharge





4.  Toxic encephalopathy


Secondary to drug overdose and alcohol intoxication.  Resolving





5.  Severe major depression


She will need to be initiated on depression medicine.  She may require referral 

to outpatient psychiatry.  Will defer to her primary care physician for this.





6.  Migraine headaches


She will resume her home dose of Topamax





7.  Seizure disorder


Continue home dose of Topamax





8.  Hypertensive urgency


Resolved.  Continue lisinopril 10 mg daily





9.  Hyponatremia


Resolved





10.  ITP; pancytopenia


The patient follows with hematology as an outpatient and carries a diagnosis of 

ITP.  Blood counts are low but stable.  Would recommend keeping her regularly 

scheduled appointment with her hematologist





11.  Hypokalemia


Repleted on the day of discharge





12.  Hypomagnesemia


Repleted on the day of discharge.  I have written a prescription for ongoing 

magnesium supplementation








- HPI


History of Present Illness: 





From the admission HP:


pt with h/o depression, anxiety and previous SI brought to hospital s/p OD on 

approx 20 pills, per . pt also consumed large amounts of etoh.   

denies having an altercation but states he had told her she needs to sleep.  she

also reportedly vomited.  no reports of suicide per .








- HOSPITAL COURSE


Hospital Course: 


The patient was admitted to the hospital with a drug overdose and alcohol 

intoxication.  She tested positive for tricyclic antidepressants and had an 

empty bottle of amitriptyline found at the house by her .  She initially 

was significantly confused and lethargic.  She did develop evidence of alcohol 

withdrawal and was treated with CIWA protocol.  Initially this was felt to be an

intentional drug overdose and suicide attempt.  We were considering holding her 

and pursuing inpatient psychiatric evaluation and treatment.  However as the pat

rbitany woke up became clear that she had no recollection of taking the pills.  She

states that she was intoxicated and apparently her and her  had an 

argument about this and she apparently took the pills while intoxicated and has 

no memory of this.  She has no further suicidal ideation and does not have a 

plan to harm herself.  Our social workers have set up appropriate outpatient 

follow-up for her.  We do not think that she needs to pursue inpatient 

psychiatric placement at this time.  She likely needs treatment for a severe 

major depression but I will defer to her primary care provider on how he would 

like to treat this or whether he would want to pursue psychiatric evaluation as 

an outpatient.  She had multiple electrolyte abnormalities that were repleted 

during this hospitalization.  She also had extremely high blood pressures and 

was started on lisinopril.  On the day of discharge the patient is quite stable 

and desires to go home.  We have given her a list of AA meetings and at this 

point she seems motivated to stop drinking as this whole episode has scared her 

greatly.  I discussed the plan of care with her and her  and all of their

questions were answered.  At this point maximum hospital benefit has been 

reached.  The patient will be discharged today in stable condition.








- ALLERGIES


Allergies/Adverse Reactions: 


                                    Allergies











Allergy/AdvReac Type Severity Reaction Status Date / Time


 


Sulfa (Sulfonamide Allergy  Anaphylaxis Verified 10/11/23 20:10





Antibiotics)     














- MEDICATIONS


Home Medications: 


                                Ambulatory Orders











 Medication  Instructions  Recorded  Confirmed


 


LORazepam [Ativan] 0.5 mg PO PRN PRN 04/08/22 09/02/24


 


DULoxetine [Cymbalta] 60 mg PO DAILY 10/11/23 09/02/24


 


Montelukast [Singulair] 10 mg PO QPM 10/11/23 09/02/24


 


traZODone [Desyrel] 50 mg PO HS 10/11/23 09/02/24


 


Benralizumab [Fasenra Pen] 30 mg SUBQ Q56D 09/02/24 09/02/24


 


Cetirizine [ZyrTEC] 10 mg PO BID 09/02/24 09/02/24


 


Fluticasone/Umeclidin/Vilanter 1 unit INH DAILY 09/02/24 09/02/24





[Trelegy Ellipta 200-62.5-25]   


 


Topiramate [Topamax] 150 mg PO BID 09/02/24 09/02/24


 


Famotidine [Pepcid] 20 mg PO BID #60 tab 09/06/24 


 


Magnesium Oxide [Mag Ox] 400 mg PO BID #60 tablet 09/06/24 


 


Thiamine [Vitamin B-1] 100 mg PO DAILY #30 tab 09/06/24 


 


lisinopriL [Zestril] 10 mg PO DAILY #60 tab 09/06/24 














- PHYSICAL EXAM AT DISCHARGE


General Appearance: positive: No acute distress


Eyes Bilateral: positive: Normal inspection


ENT: positive: ENT inspection nml


Neck: positive: Nml inspection


Respiratory: positive: Chest non-tender, No respiratory distress, Breath sounds 

nml


Cardiovascular: positive: Regular rate & rhythm, No murmur, No gallop.  

negative: Friction rub


Abdomen: positive: Non-tender, No organomegaly, Nml bowel sounds, No distention


Skin: positive: Color nml, No rash, Warm, Dry


Neurologic/Psychiatric: positive: Oriented x3, CN's nml (2-12)





- LABS


Result Diagrams: 


                                 09/06/24 05:17





                                 09/06/24 05:17





- SEPSIS


Current Stage of Sepsis: Ruled out





- FOLLOW UP


Follow Up: 


The patient should follow-up with her primary care physician Dr. Joseph Brandon 

at first available appointment








- TIME SPENT


Time Spent in Discharge (Minutes): 45

## 2024-09-06 NOTE — DISCHARGE PLAN
Discharge Plan


Problem Reviewed?: Yes


Disposition: 01 Home, Self Care


Condition: Fair


Prescriptions: 


Magnesium Oxide [Mag Ox] 400 mg PO BID #60 tablet


Famotidine [Pepcid] 20 mg PO BID #60 tab


Thiamine [Vitamin B-1] 100 mg PO DAILY #30 tab


lisinopriL [Zestril] 10 mg PO DAILY #60 tab


Diet: Regular


Activity Restrictions: No Restrictions


Plan of Treatment: 


You were admitted to the hospital with a drug overdose. Shortly after admission 

you began to have evidence of alcohol withdrawal. You were quite confused and 

lethargic and it was felt that you likely had taken amitriptyline.  Poison 

control was contacted and you were monitored quite closely but fortunately began

 to improve.  Initially were going to be held for inpatient psychiatric 

evaluation.  However after further talking to you it appears that your suicide 

attempt was due to alcohol intoxication and you really have no recollection or 

preconceived plans to harm your self.  Our social work team has set up 

appropriate follow-up for you.  We have given you a list of local AA meetings 

and highly encourage you to get involved.  You have had multiple electrolyte 

abnormalities.  Would recommend taking a multivitamin as an outpatient and I 

have written you a prescription for ongoing magnesium.  You should follow-up 

with your primary care physician as soon as possible as you likely will need to 

be initiated on a depression medicine.


Assessment: 


1.  Drug overdose


This was an intentional drug overdose however it was taken while she was 

intoxicated and upset.  She did test positive for tricyclics and likely took 

amitriptyline as her  found an empty bottle at home.  She was monitored 

closely with serial EKGs and did not develop any significant issues.





2.  Possible suicide attempt


Again this was intentional but done while intoxicated.  She has no suicidal 

ideation at this time.  Social workers have set up appropriate follow-up.





3.  Alcohol withdrawal


She received CIWA protocol during this hospitalization.  She has no further 

evidence of alcohol withdrawal on the day of discharge





4.  Toxic encephalopathy


Secondary to drug overdose and alcohol intoxication.  Resolving





5.  Severe major depression


She will need to be initiated on depression medicine.  She may require referral 

to outpatient psychiatry.  Will defer to her primary care physician for this.





6.  Migraine headaches


She will resume her home dose of Topamax





7.  Seizure disorder


Continue home dose of Topamax





8.  Hypertensive urgency


Resolved.  Continue lisinopril 10 mg daily





9.  Hyponatremia


Resolved





10.  ITP; pancytopenia


The patient follows with hematology as an outpatient and carries a diagnosis of 

ITP.  Blood counts are low but stable.  Would recommend keeping her regularly 

scheduled appointment with her hematologist





11.  Hypokalemia


Repleted on the day of discharge





12.  Hypomagnesemia


Repleted on the day of discharge.  I have written a prescription for ongoing 

magnesium supplementation


No Smoking: If you smoke, Please STOP!  Call [phone] for help.


Follow-up with: 


LU ROSA DO [Physician No Access] -

## 2024-12-21 NOTE — XRAY REPORT
Reason:  PAIN UNSPECIFIED JOINT, LOW BACK PAIN

Procedure Date:  02/25/2019   

Accession Number:  [id number] / [id number]                    

Procedure:  WCP - Pelvis 1 View CPT Code:  

 

FULL RESULT:

 

 

EXAM:

PELVIS RADIOGRAPHY

 

EXAM DATE: 2/25/2019 03:10 PM.

 

CLINICAL HISTORY: PAIN UNSPECIFIED JOINT, LOW BACK PAIN.

 

COMPARISON: None.

 

TECHNIQUE: 1 view.

 

FINDINGS:

Bones: Normal. No fracture or bone lesion.

 

Joints: The visualized hip, pubis symphysis, and sacroiliac joints are 

preserved. No subluxation.

 

Soft Tissues: IUD No soft tissue swelling.

IMPRESSION: Normal pelvis radiography.

 

[facility] FAMILY HISTORY:  Father  Still living? Unknown  Family history of prostate cancer, Age at diagnosis: Age Unknown